# Patient Record
Sex: FEMALE
[De-identification: names, ages, dates, MRNs, and addresses within clinical notes are randomized per-mention and may not be internally consistent; named-entity substitution may affect disease eponyms.]

---

## 2018-10-30 ENCOUNTER — HOSPITAL ENCOUNTER (INPATIENT)
Dept: HOSPITAL 63 - ER | Age: 63
LOS: 6 days | Discharge: NURSING, REHAB OR LONG-TERM CARE | DRG: 885 | End: 2018-11-05
Attending: PSYCHIATRY & NEUROLOGY | Admitting: PSYCHIATRY & NEUROLOGY
Payer: COMMERCIAL

## 2018-10-30 VITALS — DIASTOLIC BLOOD PRESSURE: 79 MMHG | SYSTOLIC BLOOD PRESSURE: 147 MMHG

## 2018-10-30 VITALS — DIASTOLIC BLOOD PRESSURE: 71 MMHG | SYSTOLIC BLOOD PRESSURE: 125 MMHG

## 2018-10-30 VITALS — HEIGHT: 64 IN | WEIGHT: 159.5 LBS | BODY MASS INDEX: 27.23 KG/M2

## 2018-10-30 DIAGNOSIS — N39.0: ICD-10-CM

## 2018-10-30 DIAGNOSIS — E11.42: ICD-10-CM

## 2018-10-30 DIAGNOSIS — Z88.8: ICD-10-CM

## 2018-10-30 DIAGNOSIS — M15.9: ICD-10-CM

## 2018-10-30 DIAGNOSIS — H40.9: ICD-10-CM

## 2018-10-30 DIAGNOSIS — F31.60: Primary | ICD-10-CM

## 2018-10-30 DIAGNOSIS — E11.51: ICD-10-CM

## 2018-10-30 DIAGNOSIS — F41.9: ICD-10-CM

## 2018-10-30 DIAGNOSIS — Z91.14: ICD-10-CM

## 2018-10-30 DIAGNOSIS — M54.9: ICD-10-CM

## 2018-10-30 DIAGNOSIS — I10: ICD-10-CM

## 2018-10-30 DIAGNOSIS — F63.9: ICD-10-CM

## 2018-10-30 DIAGNOSIS — Z79.84: ICD-10-CM

## 2018-10-30 DIAGNOSIS — G89.29: ICD-10-CM

## 2018-10-30 LAB
ALBUMIN SERPL-MCNC: 3.9 G/DL (ref 3.4–5)
ALBUMIN/GLOB SERPL: 1 {RATIO} (ref 1–1.7)
ALP SERPL-CCNC: 66 U/L (ref 46–116)
ALT SERPL-CCNC: 23 U/L (ref 14–59)
ANION GAP SERPL CALC-SCNC: 8 MMOL/L (ref 6–14)
APTT PPP: (no result) S
AST SERPL-CCNC: 10 U/L (ref 15–37)
BACTERIA #/AREA URNS HPF: (no result) /HPF
BASOPHILS # BLD AUTO: 0 X10^3/UL (ref 0–0.2)
BASOPHILS NFR BLD: 0 % (ref 0–3)
BILIRUB SERPL-MCNC: 0.3 MG/DL (ref 0.2–1)
BILIRUB UR QL STRIP: (no result)
BUN/CREAT SERPL: 16 (ref 6–20)
CA-I SERPL ISE-MCNC: 11 MG/DL (ref 7–20)
CALCIUM SERPL-MCNC: 9.5 MG/DL (ref 8.5–10.1)
CHLORIDE SERPL-SCNC: 99 MMOL/L (ref 98–107)
CO2 SERPL-SCNC: 29 MMOL/L (ref 21–32)
CREAT SERPL-MCNC: 0.7 MG/DL (ref 0.6–1)
EOSINOPHIL NFR BLD: 0.1 X10^3/UL (ref 0–0.7)
EOSINOPHIL NFR BLD: 1 % (ref 0–3)
ERYTHROCYTE [DISTWIDTH] IN BLOOD BY AUTOMATED COUNT: 13.8 % (ref 11.5–14.5)
FIBRINOGEN PPP-MCNC: (no result) MG/DL
GFR SERPLBLD BASED ON 1.73 SQ M-ARVRAT: 84.5 ML/MIN
GLOBULIN SER-MCNC: 3.9 G/DL (ref 2.2–3.8)
GLUCOSE SERPL-MCNC: 159 MG/DL (ref 70–99)
GLUCOSE UR STRIP-MCNC: (no result) MG/DL
HCT VFR BLD CALC: 39.5 % (ref 36–47)
HGB BLD-MCNC: 13.6 G/DL (ref 12–15.5)
LYMPHOCYTES # BLD: 1.2 X10^3/UL (ref 1–4.8)
LYMPHOCYTES NFR BLD AUTO: 22 % (ref 24–48)
MAGNESIUM SERPL-MCNC: 1.9 MG/DL (ref 1.8–2.4)
MCH RBC QN AUTO: 30 PG (ref 25–35)
MCHC RBC AUTO-ENTMCNC: 35 G/DL (ref 31–37)
MCV RBC AUTO: 85 FL (ref 79–100)
MONO #: 0.4 X10^3/UL (ref 0–1.1)
MONOCYTES NFR BLD: 7 % (ref 0–9)
NEUT #: 3.6 X10^3UL (ref 1.8–7.7)
NEUTROPHILS NFR BLD AUTO: 69 % (ref 31–73)
NITRITE UR QL STRIP: (no result)
PLATELET # BLD AUTO: 317 X10^3/UL (ref 140–400)
POTASSIUM SERPL-SCNC: 4.5 MMOL/L (ref 3.5–5.1)
PROT SERPL-MCNC: 7.8 G/DL (ref 6.4–8.2)
RBC # BLD AUTO: 4.63 X10^6/UL (ref 3.5–5.4)
RBC #/AREA URNS HPF: (no result) /HPF (ref 0–2)
SODIUM SERPL-SCNC: 136 MMOL/L (ref 136–145)
SP GR UR STRIP: 1.02
SQUAMOUS #/AREA URNS LPF: (no result) /LPF
UROBILINOGEN UR-MCNC: 0.2 MG/DL
WBC # BLD AUTO: 5.3 X10^3/UL (ref 4–11)
WBC #/AREA URNS HPF: >40 /HPF (ref 0–4)

## 2018-10-30 PROCEDURE — 82607 VITAMIN B-12: CPT

## 2018-10-30 PROCEDURE — 83735 ASSAY OF MAGNESIUM: CPT

## 2018-10-30 PROCEDURE — 80061 LIPID PANEL: CPT

## 2018-10-30 PROCEDURE — 82947 ASSAY GLUCOSE BLOOD QUANT: CPT

## 2018-10-30 PROCEDURE — 83540 ASSAY OF IRON: CPT

## 2018-10-30 PROCEDURE — 81001 URINALYSIS AUTO W/SCOPE: CPT

## 2018-10-30 PROCEDURE — 84436 ASSAY OF TOTAL THYROXINE: CPT

## 2018-10-30 PROCEDURE — 86592 SYPHILIS TEST NON-TREP QUAL: CPT

## 2018-10-30 PROCEDURE — 93005 ELECTROCARDIOGRAM TRACING: CPT

## 2018-10-30 PROCEDURE — 84480 ASSAY TRIIODOTHYRONINE (T3): CPT

## 2018-10-30 PROCEDURE — 84443 ASSAY THYROID STIM HORMONE: CPT

## 2018-10-30 PROCEDURE — 82306 VITAMIN D 25 HYDROXY: CPT

## 2018-10-30 PROCEDURE — 85025 COMPLETE CBC W/AUTO DIFF WBC: CPT

## 2018-10-30 PROCEDURE — 80053 COMPREHEN METABOLIC PANEL: CPT

## 2018-10-30 PROCEDURE — 36415 COLL VENOUS BLD VENIPUNCTURE: CPT

## 2018-10-30 PROCEDURE — 87086 URINE CULTURE/COLONY COUNT: CPT

## 2018-10-30 PROCEDURE — 83550 IRON BINDING TEST: CPT

## 2018-10-30 PROCEDURE — 83036 HEMOGLOBIN GLYCOSYLATED A1C: CPT

## 2018-10-30 RX ADMIN — ZOLPIDEM TARTRATE SCH MG: 5 TABLET ORAL at 19:58

## 2018-10-30 RX ADMIN — LURASIDONE HYDROCHLORIDE SCH MG: 40 TABLET, FILM COATED ORAL at 17:01

## 2018-10-30 RX ADMIN — LATANOPROST SCH DROP: 50 SOLUTION OPHTHALMIC at 19:58

## 2018-10-30 NOTE — PDOC
Exam


Note:


Talib Note:


Please also refer to the separate dictated note~for this date of service 

dictated separately.~Patient seen individually. Discussed the patient with 

Nursing staff reviewed the chart.~Reviewed interim history and current 

functioning. Reviewed vital signs,~Labs/ Radiology~and current medications 

noted below. Continue current treatment with the changes noted in the dictated 

addendum note





Assessment:


Vital Signs:





 Vital Signs








  Date Time  Temp Pulse Resp B/P (MAP) Pulse Ox O2 Delivery O2 Flow Rate FiO2


 


10/30/18 16:01 96.9 71 20 125/71 (89) 95 Room Air  








Labs:





 Laboratory Tests








Test


 10/30/18


09:03 10/30/18


09:04


 


White Blood Count


 5.3 x10^3/uL


(4.0-11.0) 





 


Red Blood Count


 4.63 x10^6/uL


(3.50-5.40) 





 


Hemoglobin


 13.6 g/dL


(12.0-15.5) 





 


Hematocrit


 39.5 %


(36.0-47.0) 





 


Mean Corpuscular Volume


 85 fL ()


 





 


Mean Corpuscular Hemoglobin 30 pg (25-35)   


 


Mean Corpuscular Hemoglobin


Concent 35 g/dL


(31-37) 





 


Red Cell Distribution Width


 13.8 %


(11.5-14.5) 





 


Platelet Count


 317 x10^3/uL


(140-400) 





 


Neutrophils (%) (Auto) 69 % (31-73)   


 


Lymphocytes (%) (Auto) 22 % (24-48)  L 


 


Monocytes (%) (Auto) 7 % (0-9)   


 


Eosinophils (%) (Auto) 1 % (0-3)   


 


Basophils (%) (Auto) 0 % (0-3)   


 


Neutrophils # (Auto)


 3.6 x10^3uL


(1.8-7.7) 





 


Lymphocytes # (Auto)


 1.2 x10^3/uL


(1.0-4.8) 





 


Monocytes # (Auto)


 0.4 x10^3/uL


(0.0-1.1) 





 


Eosinophils # (Auto)


 0.1 x10^3/uL


(0.0-0.7) 





 


Basophils # (Auto)


 0.0 x10^3/uL


(0.0-0.2) 





 


Sodium Level


 136 mmol/L


(136-145) 





 


Potassium Level


 4.5 mmol/L


(3.5-5.1) 





 


Chloride Level


 99 mmol/L


() 





 


Carbon Dioxide Level


 29 mmol/L


(21-32) 





 


Anion Gap 8 (6-14)   


 


Blood Urea Nitrogen


 11 mg/dL


(7-20) 





 


Creatinine


 0.7 mg/dL


(0.6-1.0) 





 


Estimated GFR


(Cockcroft-Gault) 84.5  


 





 


BUN/Creatinine Ratio 16 (6-20)   


 


Glucose Level


 159 mg/dL


(70-99)  H 





 


Calcium Level


 9.5 mg/dL


(8.5-10.1) 





 


Magnesium Level


 1.9 mg/dL


(1.8-2.4) 





 


Total Bilirubin


 0.3 mg/dL


(0.2-1.0) 





 


Aspartate Amino Transferase


(AST) 10 U/L (15-37)


L 





 


Alanine Aminotransferase (ALT)


 23 U/L (14-59)


 





 


Alkaline Phosphatase


 66 U/L


() 





 


Total Protein


 7.8 g/dL


(6.4-8.2) 





 


Albumin


 3.9 g/dL


(3.4-5.0) 





 


Albumin/Globulin Ratio 1.0 (1.0-1.7)   


 


Urine Collection Type  Unknown  


 


Urine Color  Kathrine  


 


Urine Clarity  Cloudy  


 


Urine pH  6.5  


 


Urine Specific Gravity  1.020  


 


Urine Protein


 


 30 mg/dl


(NEG-TRACE)


 


Urine Glucose (UA)


 


 Neg mg/dL


(NEG)


 


Urine Ketones (Stick)


 


 Trace mg/dL


(NEG)


 


Urine Blood  Trace (NEG)  


 


Urine Nitrite  Neg (NEG)  


 


Urine Bilirubin  Neg (NEG)  


 


Urine Urobilinogen Dipstick


 


 0.2 mg/dL (0.2


mg/dL)


 


Urine Leukocyte Esterase  Mod (NEG)  


 


Urine RBC


 


 1-2 /HPF (0-2)





 


Urine WBC


 


 >40 /HPF (0-4)





 


Urine Squamous Epithelial


Cells 


 Many /LPF  





 


Urine Transitional Epithelial


Cells 


 Few /LPF  





 


Urine Bacteria


 


 Mod /HPF


(0-FEW)


 


Urine Mucus  Marked /LPF  











Current Medications:


Meds:





Current Medications


Trimethoprim/ Sulfamethoxazole (Bactrim Ds) 1 tab 1X  ONCE PO  Last 

administered on 10/30/18at 09:56;  Start 10/30/18 at 10:10;  Stop 10/30/18 at 10

:11;  Status DC


Acetaminophen (Tylenol) 650 mg PRN Q6HRS  PRN PO PAIN / TEMP;  Start 10/30/18 

at 12:00


Multi-Ingredient Ointment (Analgesic Balm) 1 charis PRN QID  PRN TP MUSCLE PAIN;  

Start 10/30/18 at 12:00


Al Hydroxide/Mg Hydroxide (Mylanta Plus Xs) 15 ml PRN AFTMEALHC  PRN PO 

DYSPEPSIA;  Start 10/30/18 at 12:00


Magnesium Hydroxide (Milk Of Magnesia) 2,400 mg PRN QHS  PRN PO CONSTIPATION;  

Start 10/30/18 at 12:00


Diclofenac Sodium (Voltaren) 1 charis PRN QID  PRN TP PAIN;  Start 10/30/18 at 12:

30;  Stop 10/30/18 at 13:22;  Status DC


Ibuprofen (Motrin) 600 mg PRN Q6HRS  PRN PO INFLAMMATION;  Start 10/30/18 at 12:

45


Ascorbic Acid (Vitamin C) 500 mg DAILY PO ;  Start 10/31/18 at 09:00


Insulin Glargine (Lantus) 20 units QHS SQ ;  Start 10/30/18 at 21:00;  Stop 10/

30/18 at 21:00;  Status DC


Metformin HCl (Glucophage) 1,000 mg BIDWMEALS PO ;  Start 10/30/18 at 17:00;  

Stop 10/30/18 at 17:00;  Status DC


Metformin HCl (Glucophage) 1,000 mg DAILYWBKFT PO ;  Start 10/31/18 at 08:00


Zolpidem Tartrate (Ambien) 5 mg PRN QHS  PRN PO INSOMNIA;  Start 10/30/18 at 13:

45;  Stop 10/30/18 at 13:46;  Status DC


Benzocaine (Ora-Jel Maximum) 1 charis PRN Q6HRS  PRN TP ORAL PAIN;  Start 10/30/18 

at 13:45


Glucose (Insta-Glucose) 15 gm PRN Q15MIN  PRN PO LOW BLOOD SUGAR;  Start 10/30/

18 at 13:45


Latanoprost (Xalatan) 1 drop QHS OU  Last administered on 10/30/18at 19:58;  

Start 10/30/18 at 21:00


Lurasidone HCl (Latuda) 80 mg DAILYWSUP PO  Last administered on 10/30/18at 17:

01;  Start 10/30/18 at 17:00


Insulin Glargine (Lantus) 20 units DAILY SQ ;  Start 10/31/18 at 09:00


Zolpidem Tartrate (Ambien) 5 mg QHS PO  Last administered on 10/30/18at 19:58;  

Start 10/30/18 at 21:00





Active Scripts


Active


Reported


Glucose (Dextrose) 15 Gm/59 Ml Liquid 15 Gm PO PRN QID


Anbesol (Benzocaine) 9 Gm Gel..gram. 9 Gm MM PRN Q6HRS PRN


Latanoprost 2.5 Ml Drops 1 Drop EACHEYE QHS


Ibuprofen 400 Mg Tablet 600 Mg PO PRN Q6HRS PRN


Metformin Hcl 1,000 Mg Tablet 1,000 Mg PO DAILY


Vitamin C (Ascorbic Acid) 500 Mg Capsule 500 Mg PO DAILY


Levemir (Insulin Detemir) 100 Unit/1 Ml Vial 20 Unit SQ HS


Latuda (Lurasidone Hcl) 80 Mg Tablet 80 Mg PO DAILYBFRSUP


Ambien (Zolpidem Tartrate) 5 Mg Tablet 5 Mg PO PRN QHS PRN


I have reviewed the current psychotropics carefully including drug 

interactions.  Risk benefit ratio favors no change other than as noted in my 

dictated progress note.





Diagnosis:


Problems:  


(1) Urinary tract infection


(2) Diabetes mellitus


(3) Behavioral problem


(4) Medical clearance for psychiatric admission


(5) Anxiety disorder


(6) Bipolar affective, mixed, sev w/ psych


(7) Impulse control disorder











WESLY RAZA MD Oct 30, 2018 23:05

## 2018-10-30 NOTE — EKG
[facility] [address], [place] [address]

Test Date:    2018-10-30               Test Time:    09:12:29

Pat Name:     MAREK CROWDER      Department:   

Patient ID:   [id number]           Room:          

Gender:       F                        Technician:   

:          [id number]               Requested By: FISH LYLE

Order Number: [id number]            Reading MD:   Murray Gutierrez

                                 Measurements

Intervals                              Axis          

Rate:         50                       P:            69

NH:           152                      QRS:          1

QRSD:         80                       T:            49

QT:           424                                    

QTc:          389                                    

                           Interpretive Statements

SINUS RHYTHM

INCOMPLETE RIGHT BUNDLE BRANCH BLOCK



Electronically Signed On 2018 10:19:23 CDT by Murray Gutierrez

## 2018-10-30 NOTE — PHYS DOC
Past History


Past Medical History:  Alcoholism, Arthritis, Diabetes, Hypertension, Other


Past Surgical History:  Other


Alcohol Use:  Occasionally


Drug Use:  None





Adult General


Chief Complaint


Chief Complaint:  PSYCH EVALUATION





[facility]





Patient is a 63 year old female who brought in by nursing home staff for 

medical clearance for psych admission. Nursing home reported that patient had 

hallucination and talking to third person and had aggressive behavior. Patient 

is alert and oriented x3 and denies any problem and states she doesn't know why 

she is here. Patient denies suicidal and homicidal ideation and hallucination.





Review of Systems


Review of Systems





Constitutional: Denies fever or chills []


Eyes: Denies change in visual acuity, redness, or eye pain []


HENT: Denies nasal congestion or sore throat []


Respiratory: Denies cough or shortness of breath []


Cardiovascular: No additional information not addressed in HPI []


GI: Denies abdominal pain, nausea, vomiting, bloody stools or diarrhea []


: Denies dysuria or hematuria []


Musculoskeletal: Denies back pain or joint pain []


Integument: Denies rash or skin lesions []


Neurologic: Denies headache, focal weakness or sensory changes []


Endocrine: Denies polyuria or polydipsia []





All other systems were reviewed and found to be within normal limits, except as 

documented in this note.





Allergies


Allergies





Allergies








Coded Allergies Type Severity Reaction Last Updated Verified


 


  bimatoprost Allergy Intermediate  10/30/18 Yes


 


  clotrimazole Allergy Intermediate  10/30/18 Yes


 


  pioglitazone Allergy Intermediate  10/30/18 Yes


 


  glyburide Adverse Reaction Intermediate  10/30/18 Yes











Physical Exam


Physical Exam





Constitutional: Well developed, well nourished, no acute distress, non-toxic 

appearance. []


HENT: Normocephalic, atraumatic


Eyes: PERRLA, EOMI, conjunctiva normal, no discharge. [] 


Neck: Normal range of motion, no tenderness, supple, no stridor. [] 


Cardiovascular:Heart rate regular rhythm, no murmur []


Lungs & Thorax:  Bilateral breath sounds clear to auscultation []


Abdomen: Bowel sounds normal, soft, no tenderness, no masses, no pulsatile 

masses. [] 


Skin: Warm, dry, no erythema, no rash. [] 


Back: No tenderness, no CVA tenderness. [] 


Extremities: No tenderness, no cyanosis, no clubbing, ROM intact, no edema. [] 


Neurologic: Alert and oriented X 3, normal motor function, normal sensory 

function, no focal deficits noted. []


Psychologic: Affect normal, judgement normal, mood normal. []





Current Patient Data


Vital Signs





 Vital Signs








  Date Time  Temp Pulse Resp B/P (MAP) Pulse Ox O2 Delivery O2 Flow Rate FiO2


 


10/30/18 09:11 98.3 59 18  96 Room Air  








Lab Results





 Laboratory Tests








Test


 10/30/18


09:03 10/30/18


09:04


 


White Blood Count


 5.3 x10^3/uL


(4.0-11.0) 





 


Red Blood Count


 4.63 x10^6/uL


(3.50-5.40) 





 


Hemoglobin


 13.6 g/dL


(12.0-15.5) 





 


Hematocrit


 39.5 %


(36.0-47.0) 





 


Mean Corpuscular Volume


 85 fL ()


 





 


Mean Corpuscular Hemoglobin 30 pg (25-35)   


 


Mean Corpuscular Hemoglobin


Concent 35 g/dL


(31-37) 





 


Red Cell Distribution Width


 13.8 %


(11.5-14.5) 





 


Platelet Count


 317 x10^3/uL


(140-400) 





 


Neutrophils (%) (Auto) 69 % (31-73)   


 


Lymphocytes (%) (Auto) 22 % (24-48)  L 


 


Monocytes (%) (Auto) 7 % (0-9)   


 


Eosinophils (%) (Auto) 1 % (0-3)   


 


Basophils (%) (Auto) 0 % (0-3)   


 


Neutrophils # (Auto)


 3.6 x10^3uL


(1.8-7.7) 





 


Lymphocytes # (Auto)


 1.2 x10^3/uL


(1.0-4.8) 





 


Monocytes # (Auto)


 0.4 x10^3/uL


(0.0-1.1) 





 


Eosinophils # (Auto)


 0.1 x10^3/uL


(0.0-0.7) 





 


Basophils # (Auto)


 0.0 x10^3/uL


(0.0-0.2) 





 


Sodium Level


 136 mmol/L


(136-145) 





 


Potassium Level


 4.5 mmol/L


(3.5-5.1) 





 


Chloride Level


 99 mmol/L


() 





 


Carbon Dioxide Level


 29 mmol/L


(21-32) 





 


Anion Gap 8 (6-14)   


 


Blood Urea Nitrogen


 11 mg/dL


(7-20) 





 


Creatinine


 0.7 mg/dL


(0.6-1.0) 





 


Estimated GFR


(Cockcroft-Gault) 84.5  


 





 


BUN/Creatinine Ratio 16 (6-20)   


 


Glucose Level


 159 mg/dL


(70-99)  H 





 


Calcium Level


 9.5 mg/dL


(8.5-10.1) 





 


Magnesium Level


 1.9 mg/dL


(1.8-2.4) 





 


Total Bilirubin


 0.3 mg/dL


(0.2-1.0) 





 


Aspartate Amino Transferase


(AST) 10 U/L (15-37)


L 





 


Alanine Aminotransferase (ALT)


 23 U/L (14-59)


 





 


Alkaline Phosphatase


 66 U/L


() 





 


Total Protein


 7.8 g/dL


(6.4-8.2) 





 


Albumin


 3.9 g/dL


(3.4-5.0) 





 


Albumin/Globulin Ratio 1.0 (1.0-1.7)   


 


Urine Collection Type  Unknown  


 


Urine Color  Kathrine  


 


Urine Clarity  Cloudy  


 


Urine pH  6.5  


 


Urine Specific Gravity  1.020  


 


Urine Protein


 


 30 mg/dl


(NEG-TRACE)


 


Urine Glucose (UA)


 


 Neg mg/dL


(NEG)


 


Urine Ketones (Stick)


 


 Trace mg/dL


(NEG)


 


Urine Blood  Trace (NEG)  


 


Urine Nitrite  Neg (NEG)  


 


Urine Bilirubin  Neg (NEG)  


 


Urine Urobilinogen Dipstick


 


 0.2 mg/dL (0.2


mg/dL)


 


Urine Leukocyte Esterase  Mod (NEG)  


 


Urine RBC


 


 1-2 /HPF (0-2)





 


Urine WBC


 


 >40 /HPF (0-4)





 


Urine Squamous Epithelial


Cells 


 Many /LPF  





 


Urine Transitional Epithelial


Cells 


 Few /LPF  





 


Urine Bacteria


 


 Mod /HPF


(0-FEW)


 


Urine Mucus  Marked /LPF  











EKG


EKG


EKG interpreted by me. EKG at 0 912 showed sinus bradycardia at rate of 50, 

incomplete right bundle-branch block, no acute distress and T-wave abnormalities





Radiology/Procedures


Radiology/Procedures


[]





Course & Med Decision Making


Course & Med Decision Making


Pertinent Labs  reviewed. (See chart for details)





Evaluation of patient in ER showed 63-year-old female patient brought in for 

medical clearance for psych admission. Patient was alert and oriented and 

cooperative. Labs showed UTI and patient treated with Bactrim in ER. Patient 

was medically cleared for psych admission.





Dragon Disclaimer


Dragon Disclaimer


This electronic medical record was generated, in whole or in part, using a 

voice recognition dictation system.





Departure


Departure:


Impression:  


 Primary Impression:  


 Medical clearance for psychiatric admission


 Additional Impressions:  


 Urinary tract infection


 Diabetes mellitus


 Behavioral problem


Disposition:  09 ADMITTED AS INPATIENT (to senior behavioral unit at 0945)


Condition:  STABLE


Referrals:  


JOSE MTZ MD (PCP)





Problem Qualifiers











FISH LYEL MD Oct 30, 2018 09:48

## 2018-10-30 NOTE — HP
ADMIT DATE:  10/30/2018



PSYCHIATRIC ADMISSION HISTORY/EVALUATION



This note covers elements not covered in my initial note 10/30/2018.



IDENTIFYING DATA:  The patient is a 63-year-old  female who is referred

to us from [facility] in [place], [place] by Dr. Pulido,

her primary care physician on account of having auditory and visual

hallucinations.  Reportedly, she has been aggressive, talks in the third person.

 She is noncompliant with medications and cares.  She has been making Hinduism

statements and grandiose statements that she has been "healed."  She has been

exit seeking, has failed outpatient psychiatric interventions and inpatient

psychiatric hospitalization at [facility] from 10/21/2018 to

10/26/2018.  The patient referred for inpatient psychiatric stabilization.



CHIEF COMPLAINT:  "No.  I do not see and hear things.  They just say I do.  No,

I never used to drink."



The patient minimizes most of her problems including her past alcohol abuse, but

in fact there is documentation about her significant past alcohol abuse.



HISTORY OF PRESENT ILLNESS:  The patient has a history of mood swings,

depression, psychotic symptoms and she minimizes most of this.  Cognitively, she

has been reasonably intact.  She has had some sleep disturbance, marked mood

lability, and agitation is noted.  No active suicidal or homicidal ideation.



PAST PSYCHIATRIC HISTORY:  As above.



PAST MEDICAL HISTORY:  The patient does have a UTI, received a dose of Bactrim

in the ED.  The patient does have a history of diabetes mellitus, glaucoma,

hypertension, polyneuropathy, peripheral vascular disease, osteoarthritis.



ACCU-CHEKS:  A.c. and at bedtime.



DIET:  Regular.  Takes medications whole.  Ambulates with walker.



CODE STATUS:  FULL CODE.



ALLERGIES:  GLYBURIDE, ACTOS, LOTRIMIN, LUMIGAN.



CURRENT PSYCHOTROPICS:  Latuda 80 mg at bedtime, Ambien 5 mg at bedtime.



FAMILY HISTORY:  Noncontributory.



SOCIAL HISTORY:  Alcohol abuse history as noted above.  No physical, sexual or

elder abuse history is noted.  She is not known to be a perpetrator.  She stays

still about age 10.  [place] [employer] [place].  She states she has 1 son who lives and

works in [place], [place].  [place].



MENTAL STATUS EXAM:  The patient was seen individually in her room, evening of

10/30/2018.  She was aware of the date, but felt it was 10/25/2018.  She is

aware of who the President was, able to do one step on serial sevens, remembered

2/3 objects at 3 minutes.  Speech is coherent, abstraction fair, computation

impaired, language function intact.  Mood and affect remain somewhat labile.  No

active suicidal or homicidal ideation.



LABORATORY DATA:  Reviewed.



IMPRESSION:  History of major depressive disorder with psychotic features;  past

history of alcohol abuse or dependence, possible bipolar 1 disorder, mixed with

psychotic features; anxiety disorder, unspecified; impulse control disorder,

unspecified; urinary tract infection.  Rest as above.



PLAN:  Admit to Geropsychiatry Unit at [facility].  I see the patient

daily individually from a psychiatric standpoint, medical followup with Dr. Pulido/Dr. Palmer.  Continue the patient on her current psychotropics, observe

baseline, then adjust as clinically indicated.  Treat the UTI.



ESTIMATED LENGTH OF STAY:  7-10 days.



DISPOSITION:  Plans back to [facility].





______________________________

MAN ALEXANDRIA RAZA MD



DR:  AMELIA/venancio  JOB#:  [id number] / [id number]

DD:  10/30/2018 18:42  DT:  10/30/2018 19:50

## 2018-10-31 VITALS — SYSTOLIC BLOOD PRESSURE: 126 MMHG | DIASTOLIC BLOOD PRESSURE: 76 MMHG

## 2018-10-31 VITALS — SYSTOLIC BLOOD PRESSURE: 136 MMHG | DIASTOLIC BLOOD PRESSURE: 76 MMHG

## 2018-10-31 LAB
CHOLEST/HDLC SERPL: 4 {RATIO}
HBA1C MFR BLD: 7 % (ref 4.8–5.6)
HDLC SERPL-MCNC: 58 MG/DL (ref 40–60)
LDLC: 171 MG/DL (ref 0–100)
T3 SERPL-MCNC: 93 NG/DL (ref 71–180)
T4 SERPL-MCNC: 8.9 UG/DL (ref 4.5–12)
THYROID STIM HORMONE (TSH): 1.83 UIU/ML (ref 0.36–3.74)
TRIGL SERPL-MCNC: 134 MG/DL (ref 0–150)
VLDLC: 26 MG/DL (ref 0–40)

## 2018-10-31 RX ADMIN — LURASIDONE HYDROCHLORIDE SCH MG: 40 TABLET, FILM COATED ORAL at 18:05

## 2018-10-31 RX ADMIN — SULFAMETHOXAZOLE AND TRIMETHOPRIM SCH TAB: 800; 160 TABLET ORAL at 19:50

## 2018-10-31 RX ADMIN — ZOLPIDEM TARTRATE SCH MG: 5 TABLET ORAL at 19:51

## 2018-10-31 RX ADMIN — OXYCODONE HYDROCHLORIDE AND ACETAMINOPHEN SCH MG: 500 TABLET ORAL at 08:55

## 2018-10-31 RX ADMIN — Medication SCH CAP: at 19:50

## 2018-10-31 RX ADMIN — INSULIN GLARGINE SCH UNITS: 100 INJECTION, SOLUTION SUBCUTANEOUS at 19:04

## 2018-10-31 RX ADMIN — LATANOPROST SCH DROP: 50 SOLUTION OPHTHALMIC at 19:51

## 2018-10-31 NOTE — PDOC
Exam


Note:


Talib Note:


Please also refer to the separate dictated note~for this date of service 

dictated separately.~Patient seen individually. Discussed the patient with 

Nursing staff reviewed the chart.~Reviewed interim history and current 

functioning. Reviewed vital signs,~Labs/ Radiology~and current medications 

noted below. Continue current treatment with the changes noted in the dictated 

addendum note





Assessment:


Vital Signs:





 Vital Signs








  Date Time  Temp Pulse Resp B/P (MAP) Pulse Ox O2 Delivery O2 Flow Rate FiO2


 


10/31/18 16:32 97.2 68 16 136/76 (96) 97   


 


10/30/18 16:01      Room Air  








I&O











Intake and Output 


 


 10/31/18





 07:00


 


Intake Total 580 ml


 


Balance 580 ml


 


 


 


Intake Oral 580 ml








Labs:





 Laboratory Tests








Test


 10/31/18


07:26


 


Glucose (Fingerstick)


 253 mg/dL


(70-99)  H











Current Medications:


Meds:





Current Medications


Trimethoprim/ Sulfamethoxazole (Bactrim Ds) 1 tab 1X  ONCE PO  Last 

administered on 10/30/18at 09:56;  Start 10/30/18 at 10:10;  Stop 10/30/18 at 10

:11;  Status DC


Acetaminophen (Tylenol) 650 mg PRN Q6HRS  PRN PO PAIN / TEMP;  Start 10/30/18 

at 12:00


Multi-Ingredient Ointment (Analgesic Balm) 1 charis PRN QID  PRN TP MUSCLE PAIN;  

Start 10/30/18 at 12:00


Al Hydroxide/Mg Hydroxide (Mylanta Plus Xs) 15 ml PRN AFTMEALHC  PRN PO 

DYSPEPSIA;  Start 10/30/18 at 12:00


Magnesium Hydroxide (Milk Of Magnesia) 2,400 mg PRN QHS  PRN PO CONSTIPATION;  

Start 10/30/18 at 12:00


Diclofenac Sodium (Voltaren) 1 charis PRN QID  PRN TP PAIN;  Start 10/30/18 at 12:

30;  Stop 10/30/18 at 13:22;  Status DC


Ibuprofen (Motrin) 600 mg PRN Q6HRS  PRN PO INFLAMMATION;  Start 10/30/18 at 12:

45


Ascorbic Acid (Vitamin C) 500 mg DAILY PO  Last administered on 10/31/18at 08:55

;  Start 10/31/18 at 09:00


Insulin Glargine (Lantus) 20 units QHS SQ ;  Start 10/30/18 at 21:00;  Stop 10/

30/18 at 21:00;  Status DC


Metformin HCl (Glucophage) 1,000 mg BIDWMEALS PO ;  Start 10/30/18 at 17:00;  

Stop 10/30/18 at 17:00;  Status DC


Metformin HCl (Glucophage) 1,000 mg DAILYWBKFT PO  Last administered on 10/31/

18at 08:55;  Start 10/31/18 at 08:00


Zolpidem Tartrate (Ambien) 5 mg PRN QHS  PRN PO INSOMNIA;  Start 10/30/18 at 13:

45;  Stop 10/30/18 at 13:46;  Status DC


Benzocaine (Ora-Jel Maximum) 1 charis PRN Q6HRS  PRN TP ORAL PAIN;  Start 10/30/18 

at 13:45


Glucose (Insta-Glucose) 15 gm PRN Q15MIN  PRN PO LOW BLOOD SUGAR;  Start 10/30/

18 at 13:45


Latanoprost (Xalatan) 1 drop QHS OU  Last administered on 10/31/18at 19:51;  

Start 10/30/18 at 21:00


Lurasidone HCl (Latuda) 80 mg DAILYWSUP PO  Last administered on 10/31/18at 18:

05;  Start 10/30/18 at 17:00


Insulin Glargine (Lantus) 20 units DAILY SQ  Last administered on 10/31/18at 19:

04;  Start 10/31/18 at 09:00


Zolpidem Tartrate (Ambien) 5 mg QHS PO  Last administered on 10/31/18at 19:51;  

Start 10/30/18 at 21:00


Trimethoprim/ Sulfamethoxazole (Bactrim Ds) 1 tab BID PO  Last administered on 

10/31/18at 19:50;  Start 10/31/18 at 21:00;  Stop 11/7/18 at 20:59


Lactobacillus Rhamnosus (Culturelle) 1 cap BID PO  Last administered on 10/31/

18at 19:50;  Start 10/31/18 at 21:00


Sertraline HCl (Zoloft) 25 mg DAILY PO ;  Start 11/1/18 at 09:00





Active Scripts


Active


Reported


Glucose (Dextrose) 15 Gm/59 Ml Liquid 15 Gm PO PRN QID


Anbesol (Benzocaine) 9 Gm Gel..gram. 9 Gm MM PRN Q6HRS PRN


Latanoprost 2.5 Ml Drops 1 Drop EACHEYE QHS


Ibuprofen 400 Mg Tablet 600 Mg PO PRN Q6HRS PRN


Metformin Hcl 1,000 Mg Tablet 1,000 Mg PO DAILY


Vitamin C (Ascorbic Acid) 500 Mg Capsule 500 Mg PO DAILY


Levemir (Insulin Detemir) 100 Unit/1 Ml Vial 20 Unit SQ HS


Latuda (Lurasidone Hcl) 80 Mg Tablet 80 Mg PO DAILYBFRSUP


Ambien (Zolpidem Tartrate) 5 Mg Tablet 5 Mg PO PRN QHS PRN


I have reviewed the current psychotropics carefully including drug 

interactions.  Risk benefit ratio favors no change other than as noted in my 

dictated progress note.





Diagnosis:


Problems:  


(1) Urinary tract infection


(2) Diabetes mellitus


(3) Behavioral problem


(4) Medical clearance for psychiatric admission


(5) Anxiety disorder


(6) Bipolar affective, mixed, sev w/ psych


(7) Impulse control disorder











WESLY RAZA MD Oct 31, 2018 23:15

## 2018-11-01 VITALS — SYSTOLIC BLOOD PRESSURE: 133 MMHG | DIASTOLIC BLOOD PRESSURE: 71 MMHG

## 2018-11-01 VITALS — SYSTOLIC BLOOD PRESSURE: 112 MMHG | DIASTOLIC BLOOD PRESSURE: 56 MMHG

## 2018-11-01 RX ADMIN — INSULIN GLARGINE SCH UNITS: 100 INJECTION, SOLUTION SUBCUTANEOUS at 07:37

## 2018-11-01 RX ADMIN — LATANOPROST SCH DROP: 50 SOLUTION OPHTHALMIC at 20:28

## 2018-11-01 RX ADMIN — LURASIDONE HYDROCHLORIDE SCH MG: 40 TABLET, FILM COATED ORAL at 17:17

## 2018-11-01 RX ADMIN — SULFAMETHOXAZOLE AND TRIMETHOPRIM SCH TAB: 800; 160 TABLET ORAL at 20:26

## 2018-11-01 RX ADMIN — ZOLPIDEM TARTRATE SCH MG: 5 TABLET ORAL at 20:26

## 2018-11-01 RX ADMIN — SULFAMETHOXAZOLE AND TRIMETHOPRIM SCH TAB: 800; 160 TABLET ORAL at 07:34

## 2018-11-01 RX ADMIN — ACETAMINOPHEN PRN MG: 325 TABLET, FILM COATED ORAL at 05:46

## 2018-11-01 RX ADMIN — Medication SCH CAP: at 20:28

## 2018-11-01 RX ADMIN — ACETAMINOPHEN PRN MG: 325 TABLET, FILM COATED ORAL at 18:27

## 2018-11-01 RX ADMIN — Medication SCH CAP: at 07:34

## 2018-11-01 RX ADMIN — SERTRALINE SCH MG: 25 TABLET, FILM COATED ORAL at 07:36

## 2018-11-01 RX ADMIN — OXYCODONE HYDROCHLORIDE AND ACETAMINOPHEN SCH MG: 500 TABLET ORAL at 07:34

## 2018-11-01 NOTE — PDOC
Exam


Note:


Talib Note:


Please also refer to the separate dictated note~for this date of service 

dictated separately.~Patient seen individually. Discussed the patient with 

Nursing staff reviewed the chart.~Reviewed interim history and current 

functioning. Reviewed vital signs,~Labs/ Radiology~and current medications 

noted below. Continue current treatment with the changes noted in the dictated 

addendum note





Assessment:


Vital Signs:





 Vital Signs








  Date Time  Temp Pulse Resp B/P (MAP) Pulse Ox O2 Delivery O2 Flow Rate FiO2


 


11/1/18 16:06 97.1 62 16 112/56 (74) 97 Room Air  








I&O











Intake and Output 


 


 11/1/18





 07:00


 


Intake Total 1080 ml


 


Balance 1080 ml


 


 


 


Intake Oral 1080 ml








Labs:





 Laboratory Tests








Test


 11/1/18


07:45


 


Glucose (Fingerstick)


 133 mg/dL


(70-99)  H











Current Medications:


Meds:





Current Medications


Trimethoprim/ Sulfamethoxazole (Bactrim Ds) 1 tab 1X  ONCE PO  Last 

administered on 10/30/18at 09:56;  Start 10/30/18 at 10:10;  Stop 10/30/18 at 10

:11;  Status DC


Acetaminophen (Tylenol) 650 mg PRN Q6HRS  PRN PO PAIN / TEMP Last administered 

on 11/1/18at 18:27;  Start 10/30/18 at 12:00


Multi-Ingredient Ointment (Analgesic Balm) 1 charis PRN QID  PRN TP MUSCLE PAIN;  

Start 10/30/18 at 12:00


Al Hydroxide/Mg Hydroxide (Mylanta Plus Xs) 15 ml PRN AFTMEALHC  PRN PO 

DYSPEPSIA;  Start 10/30/18 at 12:00


Magnesium Hydroxide (Milk Of Magnesia) 2,400 mg PRN QHS  PRN PO CONSTIPATION;  

Start 10/30/18 at 12:00


Diclofenac Sodium (Voltaren) 1 charis PRN QID  PRN TP PAIN;  Start 10/30/18 at 12:

30;  Stop 10/30/18 at 13:22;  Status DC


Ibuprofen (Motrin) 600 mg PRN Q6HRS  PRN PO INFLAMMATION;  Start 10/30/18 at 12:

45


Ascorbic Acid (Vitamin C) 500 mg DAILY PO  Last administered on 11/1/18at 07:34

;  Start 10/31/18 at 09:00


Insulin Glargine (Lantus) 20 units QHS SQ ;  Start 10/30/18 at 21:00;  Stop 10/

30/18 at 21:00;  Status DC


Metformin HCl (Glucophage) 1,000 mg BIDWMEALS PO ;  Start 10/30/18 at 17:00;  

Stop 10/30/18 at 17:00;  Status DC


Metformin HCl (Glucophage) 1,000 mg DAILYWBKFT PO  Last administered on 11/1/ 18at 07:34;  Start 10/31/18 at 08:00


Zolpidem Tartrate (Ambien) 5 mg PRN QHS  PRN PO INSOMNIA;  Start 10/30/18 at 13:

45;  Stop 10/30/18 at 13:46;  Status DC


Benzocaine (Ora-Jel Maximum) 1 charis PRN Q6HRS  PRN TP ORAL PAIN;  Start 10/30/18 

at 13:45


Glucose (Insta-Glucose) 15 gm PRN Q15MIN  PRN PO LOW BLOOD SUGAR;  Start 10/30/

18 at 13:45


Latanoprost (Xalatan) 1 drop QHS OU  Last administered on 11/1/18at 20:28;  

Start 10/30/18 at 21:00


Lurasidone HCl (Latuda) 80 mg DAILYWSUP PO  Last administered on 11/1/18at 17:17

;  Start 10/30/18 at 17:00


Insulin Glargine (Lantus) 20 units DAILY SQ  Last administered on 11/1/18at 07:

37;  Start 10/31/18 at 09:00


Zolpidem Tartrate (Ambien) 5 mg QHS PO  Last administered on 11/1/18at 20:26;  

Start 10/30/18 at 21:00


Trimethoprim/ Sulfamethoxazole (Bactrim Ds) 1 tab BID PO  Last administered on 

11/1/18at 20:26;  Start 10/31/18 at 21:00;  Stop 11/7/18 at 20:59


Lactobacillus Rhamnosus (Culturelle) 1 cap BID PO  Last administered on 11/1/ 18at 20:28;  Start 10/31/18 at 21:00


Sertraline HCl (Zoloft) 25 mg DAILY PO  Last administered on 11/1/18at 07:36;  

Start 11/1/18 at 09:00





Active Scripts


Active


Reported


Glucose (Dextrose) 15 Gm/59 Ml Liquid 15 Gm PO PRN QID


Anbesol (Benzocaine) 9 Gm Gel..gram. 9 Gm MM PRN Q6HRS PRN


Latanoprost 2.5 Ml Drops 1 Drop EACHEYE QHS


Ibuprofen 400 Mg Tablet 600 Mg PO PRN Q6HRS PRN


Metformin Hcl 1,000 Mg Tablet 1,000 Mg PO DAILY


Vitamin C (Ascorbic Acid) 500 Mg Capsule 500 Mg PO DAILY


Levemir (Insulin Detemir) 100 Unit/1 Ml Vial 20 Unit SQ HS


Latuda (Lurasidone Hcl) 80 Mg Tablet 80 Mg PO DAILYBFRSUP


Ambien (Zolpidem Tartrate) 5 Mg Tablet 5 Mg PO PRN QHS PRN


I have reviewed the current psychotropics carefully including drug 

interactions.  Risk benefit ratio favors no change other than as noted in my 

dictated progress note.





Diagnosis:


Problems:  


(1) Urinary tract infection


(2) Diabetes mellitus


(3) Behavioral problem


(4) Medical clearance for psychiatric admission


(5) Anxiety disorder


(6) Bipolar affective, mixed, sev w/ psych


(7) Impulse control disorder











WESLY RAZA MD Nov 1, 2018 23:06

## 2018-11-01 NOTE — CONS
DATE OF CONSULTATION:  10/31/2018



REASON FOR CONSULTATION:  Medical management.



HISTORY OF PRESENT ILLNESS:  The patient is a 63-year-old female patient,

resident at [facility] in [place], who was admitted on account of

having auditory and visual hallucination.  Reportedly, she has been aggressive

towards the third person.  She is noncompliant with medication and care.  She

has been making Sikh statement and a grandiose statement stating that she

was healed.  She has been exit seeking, has failed outpatient psychiatric

intervention and inpatient psychiatric hospitalization at [facility] from 10/26/2018-10/31/2018.  She was admitted to this unit for

inpatient psychiatric stabilization.



PAST MEDICAL HISTORY:  Significant for type 2 diabetes, glaucoma, hypertension,

diabetic polyneuropathy, peripheral vascular disease, generalized osteoarthritis

and chronic back pain.  She was diagnosed with urinary tract infection and

received a dose of Bactrim in the Emergency Department.



PAST PSYCHIATRIC HISTORY:  Significant for mood swings and depression, psychotic

symptoms; however, she cognitively seemed to be reasonably intact with no active

suicidal or homicidal ideation.



PAST SURGICAL HISTORY:  Unremarkable.



ALLERGIES:  She is allergic to GLYBURIDE, ACTOS, LOTRIMIN AND LUMIGAN.



FAMILY HISTORY:  Noncontributory.



SOCIAL HISTORY:  She is currently a resident at [facility] in [place].

 She apparently has history of alcohol abuse.  She apparently does not smoke or

use drugs.  [place].  She has 1 son, who lives and works in [place], [place].



MEDICATIONS:  She is currently on the following medications, ibuprofen 600 mg

p.o. every 6 hours as needed, lurasidone for Latuda 80 mg daily, Ambien 5 mg at

bedtime.  She is on benzocaine applied topically every 6 hours, latanoprost 1

drop to both eyes at bedtime, metformin 1000 mg daily.  She is on Levemir

insulin 20 units at bedtime, ascorbic acid 500 mg once a day.



REVIEW OF SYSTEMS:  As per history of present illness.



PHYSICAL EXAMINATION

GENERAL:  When I examined her, she was sitting on the edge of the bed

comfortably, in no apparent respiratory distress, slightly pale, but no

jaundice, cyanosis, or thyromegaly.  No jugular venous distension.  No lower

limb edema.

VITAL SIGNS:  Her heart rate was 55, blood pressure 126/76, temperature was

97.3, respiratory rate was 18 and oxygen saturation was 95%.

HEAD, EYES, EARS, NOSE AND THROAT:  Showed normocephalic, atraumatic.

NECK:  Supple.

HEART:  Showed normal first and second heart sounds.  No gallop, rub or murmur.

CHEST:  Clear to auscultation.  No crepitation or rhonchi.

ABDOMEN:  Distended, soft, nontender.  No guarding or rigidity.  No

organomegaly.  Hernial orifice intact.  Bowel sounds normal.

NEUROLOGIC:  She is awake, alert, responding appropriately.  Cranial nerves

intact.

EXTREMITIES:  She moves extremities without difficulty.  She ambulates with a

walker with minimal assistance.



LABORATORY DATA:  Showed a serum sodium 136, potassium 4.5, chloride 99,

bicarbonate 29, anion gap of 8, BUN 11, creatinine 0.7, estimated GFR was 84 mL

per minute.  Her glucose was 159, calcium was 9.5, magnesium was 1.9.  Total

bilirubin, AST, ALT, alkaline phosphatase were normal.  White cell count was

5300, hemoglobin 13.6, hematocrit 39.5, MCV 85 and platelet count 317,000.  Her

urinalysis showed the urine was cloudy with a pH of 6.5, specific gravity of

1.020.  There was small amount of protein, negative for glucose, trace of

ketones, trace of blood, negative for nitrites and bilirubin.  There was

moderate amount of leukocyte esterase, 1-2 rbc's, more than 40 wbc's, moderate

amount of bacteria.



IMPRESSION AND PLAN:  In summary, this is a 63-year-old female patient, who was

admitted on account of having auditory and visual hallucination, has been

aggressive towards the third person.  She is noncompliant with medication and

care.  She has been having grandiose and Sikh statement that has been

healed.  He has been exit seeking and has failed both inpatient and outpatient

psychiatric intervention and she is here for inpatient psychiatric

stabilization.  Medically, she is known to have type 2 diabetes, glaucoma,

hypertension, peripheral vascular disease, generalized osteoarthritis, chronic

back pain and polyneuropathy.  She did have urinary tract infection, although

the urine culture is still pending at the time of this dictation.  We will

obviously continue with all her current medications.  Continue to monitor her

blood sugar and adjust her insulin as needed.  I will review all her lab work

and make any necessary recommendation.



Thank you, Dr. Rodas for allowing me to participate in the care of this patient.





______________________________

JOSE MTZ MD



DR:  LAY/venancio  JOB#:  [id number] / [id number]

DD:  10/31/2018 15:41  DT:  11/01/2018 05:51

## 2018-11-01 NOTE — PN
DATE:  10/31/2018



PSYCHIATRIC PROGRESS NOTE



This late entry 10/31/2018 covers elements not covered in my initial note.



SUBJECTIVE:  I met with the patient in the evening.  The patient slept 7-1/4

hours previous evening, somewhat withdrawn, anxious.  She does have extensive

past history of alcohol abuse, but none recently.  No clear hallucinations noted

as I addressed with her individually.  She ambulates with a walker.  No CV, ,

pulmonary, eye system symptoms on review.



MENTAL STATUS EXAM:  Reasonably oriented.  Speech is coherent, abstraction fair,

computation impaired, language function intact, attention span short.  Mood and

affect somewhat withdrawn at times.



LABORATORY DATA:  Reviewed.



IMPRESSION:  Bipolar 1 disorder, mixed with psychotic features; history of major

depressive disorder.  Past history of alcohol abuse.



PLAN:  From a psychiatric standpoint, she does have a UTI, which could have

contributed to her admission psychiatric symptoms.  We will maintain Latuda 80

mg at bedtime, Ambien 5 mg at bedtime, start Zoloft 25 mg a day for mood,

anxiety symptoms.  Adjust as clinically indicated.





______________________________

MAN ALEXANDRIA RAZA MD



DR:  AMELIA/venancio  JOB#:  [id number] / [id number]

DD:  11/01/2018 14:13  DT:  11/01/2018 19:59

## 2018-11-02 VITALS — SYSTOLIC BLOOD PRESSURE: 124 MMHG | DIASTOLIC BLOOD PRESSURE: 71 MMHG

## 2018-11-02 VITALS — SYSTOLIC BLOOD PRESSURE: 105 MMHG | DIASTOLIC BLOOD PRESSURE: 65 MMHG

## 2018-11-02 RX ADMIN — Medication SCH CAP: at 07:53

## 2018-11-02 RX ADMIN — SULFAMETHOXAZOLE AND TRIMETHOPRIM SCH TAB: 800; 160 TABLET ORAL at 19:36

## 2018-11-02 RX ADMIN — OXYCODONE HYDROCHLORIDE AND ACETAMINOPHEN SCH MG: 500 TABLET ORAL at 07:53

## 2018-11-02 RX ADMIN — ZOLPIDEM TARTRATE SCH MG: 5 TABLET ORAL at 19:36

## 2018-11-02 RX ADMIN — SERTRALINE SCH MG: 25 TABLET, FILM COATED ORAL at 07:54

## 2018-11-02 RX ADMIN — SULFAMETHOXAZOLE AND TRIMETHOPRIM SCH TAB: 800; 160 TABLET ORAL at 07:54

## 2018-11-02 RX ADMIN — ACETAMINOPHEN PRN MG: 325 TABLET, FILM COATED ORAL at 08:40

## 2018-11-02 RX ADMIN — Medication SCH CAP: at 19:36

## 2018-11-02 RX ADMIN — INSULIN GLARGINE SCH UNITS: 100 INJECTION, SOLUTION SUBCUTANEOUS at 07:56

## 2018-11-02 RX ADMIN — LATANOPROST SCH DROP: 50 SOLUTION OPHTHALMIC at 19:56

## 2018-11-02 RX ADMIN — LURASIDONE HYDROCHLORIDE SCH MG: 40 TABLET, FILM COATED ORAL at 16:50

## 2018-11-02 NOTE — PDOC
Exam


Note:


Talib Note:


Please also refer to the separate dictated note~for this date of service 

dictated separately.~Patient seen individually. Discussed the patient with 

Nursing staff reviewed the chart.~Reviewed interim history and current 

functioning. Reviewed vital signs,~Labs/ Radiology~and current medications 

noted below. Continue current treatment with the changes noted in the dictated 

addendum note





Assessment:


Vital Signs:





 Vital Signs








  Date Time  Temp Pulse Resp B/P (MAP) Pulse Ox O2 Delivery O2 Flow Rate FiO2


 


11/2/18 15:46 98.6 61 18 105/65 (78) 97 Room Air  








I&O











Intake and Output 


 


 11/2/18





 07:00


 


Intake Total 840 ml


 


Balance 840 ml


 


 


 


Intake Oral 840 ml


 


# Voids 1








Labs:





 Laboratory Tests








Test


 11/2/18


07:26


 


Glucose (Fingerstick)


 123 mg/dL


(70-99)  H











Current Medications:


Meds:





Current Medications


Trimethoprim/ Sulfamethoxazole (Bactrim Ds) 1 tab 1X  ONCE PO  Last 

administered on 10/30/18at 09:56;  Start 10/30/18 at 10:10;  Stop 10/30/18 at 10

:11;  Status DC


Acetaminophen (Tylenol) 650 mg PRN Q6HRS  PRN PO PAIN / TEMP Last administered 

on 11/2/18at 08:40;  Start 10/30/18 at 12:00


Multi-Ingredient Ointment (Analgesic Balm) 1 charis PRN QID  PRN TP MUSCLE PAIN;  

Start 10/30/18 at 12:00


Al Hydroxide/Mg Hydroxide (Mylanta Plus Xs) 15 ml PRN AFTMEALHC  PRN PO 

DYSPEPSIA;  Start 10/30/18 at 12:00


Magnesium Hydroxide (Milk Of Magnesia) 2,400 mg PRN QHS  PRN PO CONSTIPATION;  

Start 10/30/18 at 12:00


Diclofenac Sodium (Voltaren) 1 charis PRN QID  PRN TP PAIN;  Start 10/30/18 at 12:

30;  Stop 10/30/18 at 13:22;  Status DC


Ibuprofen (Motrin) 600 mg PRN Q6HRS  PRN PO INFLAMMATION;  Start 10/30/18 at 12:

45


Ascorbic Acid (Vitamin C) 500 mg DAILY PO  Last administered on 11/2/18at 07:53

;  Start 10/31/18 at 09:00


Insulin Glargine (Lantus) 20 units QHS SQ ;  Start 10/30/18 at 21:00;  Stop 10/

30/18 at 21:00;  Status DC


Metformin HCl (Glucophage) 1,000 mg BIDWMEALS PO ;  Start 10/30/18 at 17:00;  

Stop 10/30/18 at 17:00;  Status DC


Metformin HCl (Glucophage) 1,000 mg DAILYWBKFT PO  Last administered on 11/2/ 18at 07:53;  Start 10/31/18 at 08:00


Zolpidem Tartrate (Ambien) 5 mg PRN QHS  PRN PO INSOMNIA;  Start 10/30/18 at 13:

45;  Stop 10/30/18 at 13:46;  Status DC


Benzocaine (Ora-Jel Maximum) 1 charis PRN Q6HRS  PRN TP ORAL PAIN;  Start 10/30/18 

at 13:45


Glucose (Insta-Glucose) 15 gm PRN Q15MIN  PRN PO LOW BLOOD SUGAR;  Start 10/30/

18 at 13:45


Latanoprost (Xalatan) 1 drop QHS OU  Last administered on 11/1/18at 20:28;  

Start 10/30/18 at 21:00;  Stop 11/2/18 at 19:39;  Status DC


Lurasidone HCl (Latuda) 80 mg DAILYWSUP PO  Last administered on 11/2/18at 16:50

;  Start 10/30/18 at 17:00


Insulin Glargine (Lantus) 20 units DAILY SQ  Last administered on 11/2/18at 07:

56;  Start 10/31/18 at 09:00


Zolpidem Tartrate (Ambien) 5 mg QHS PO  Last administered on 11/2/18at 19:36;  

Start 10/30/18 at 21:00


Trimethoprim/ Sulfamethoxazole (Bactrim Ds) 1 tab BID PO  Last administered on 

11/2/18at 19:36;  Start 10/31/18 at 21:00;  Stop 11/7/18 at 20:59


Lactobacillus Rhamnosus (Culturelle) 1 cap BID PO  Last administered on 11/2/ 18at 19:36;  Start 10/31/18 at 21:00


Sertraline HCl (Zoloft) 25 mg DAILY PO  Last administered on 11/2/18at 07:54;  

Start 11/1/18 at 09:00


Latanoprost (Xalatan) 1 drop QHS OU  Last administered on 11/2/18at 19:56;  

Start 11/2/18 at 19:39





Active Scripts


Active


Reported


Glucose (Dextrose) 15 Gm/59 Ml Liquid 15 Gm PO PRN QID


Anbesol (Benzocaine) 9 Gm Gel..gram. 9 Gm MM PRN Q6HRS PRN


Latanoprost 2.5 Ml Drops 1 Drop EACHEYE QHS


Ibuprofen 400 Mg Tablet 600 Mg PO PRN Q6HRS PRN


Metformin Hcl 1,000 Mg Tablet 1,000 Mg PO DAILY


Vitamin C (Ascorbic Acid) 500 Mg Capsule 500 Mg PO DAILY


Levemir (Insulin Detemir) 100 Unit/1 Ml Vial 20 Unit SQ HS


Latuda (Lurasidone Hcl) 80 Mg Tablet 80 Mg PO DAILYBFRSUP


Ambien (Zolpidem Tartrate) 5 Mg Tablet 5 Mg PO PRN QHS PRN


I have reviewed the current psychotropics carefully including drug 

interactions.  Risk benefit ratio favors no change other than as noted in my 

dictated progress note.





Diagnosis:


Problems:  


(1) Urinary tract infection


(2) Diabetes mellitus


(3) Behavioral problem


(4) Medical clearance for psychiatric admission


(5) Anxiety disorder


(6) Bipolar affective, mixed, sev w/ psych


(7) Impulse control disorder











WESLY RAZA MD Nov 2, 2018 23:01

## 2018-11-02 NOTE — PN
DATE:  11/01/2018



PSYCHIATRIC PROGRESS NOTE



This late entry 11/01/2018 covers elements not covered in my initial note.



SUBJECTIVE:  I met with the patient in the evening, staffed at a treatment team

meeting with the entire team in the morning.  Reviewed the patient's history,

diagnosis at length.  Her prior treatment at the inpatient psychiatric Facility

in [place] was reviewed.  The patient slept 6-1/2 hours previous night.  She has

been fairly calm.  Denies any active hallucinations.  No CV, , pulmonary, eye

system symptoms on review.  Ambulation impaired with walker.



MENTAL STATUS EXAM:  Reasonably oriented.  Speech is coherent, has some latency.

 Abstraction fair, computation impaired, language function intact, attention

span short.  Mood and affect showing improvement.



LABORATORY DATA:  Reviewed.



IMPRESSION:  Probable bipolar 1 disorder, mixed history of major depressive

disorder; anxiety disorder, unspecified.



PLAN:  Continue Latuda 80 mg at bedtime; Ambien 5 mg at bedtime; Zoloft 25 mg a

day, may need to increase gradually.





______________________________

MAN ALEXANDRIA RAZA MD



DR:  AMELIA/venancio  JOB#:  [id number] / [id number]

DD:  11/02/2018 12:23  DT:  11/02/2018 20:53

## 2018-11-03 VITALS — SYSTOLIC BLOOD PRESSURE: 106 MMHG | DIASTOLIC BLOOD PRESSURE: 63 MMHG

## 2018-11-03 VITALS — DIASTOLIC BLOOD PRESSURE: 67 MMHG | SYSTOLIC BLOOD PRESSURE: 119 MMHG

## 2018-11-03 RX ADMIN — Medication SCH CAP: at 08:16

## 2018-11-03 RX ADMIN — LATANOPROST SCH DROP: 50 SOLUTION OPHTHALMIC at 20:28

## 2018-11-03 RX ADMIN — ZOLPIDEM TARTRATE SCH MG: 5 TABLET ORAL at 20:28

## 2018-11-03 RX ADMIN — Medication SCH CAP: at 20:27

## 2018-11-03 RX ADMIN — OXYCODONE HYDROCHLORIDE AND ACETAMINOPHEN SCH MG: 500 TABLET ORAL at 08:16

## 2018-11-03 RX ADMIN — SERTRALINE SCH MG: 25 TABLET, FILM COATED ORAL at 08:16

## 2018-11-03 RX ADMIN — SULFAMETHOXAZOLE AND TRIMETHOPRIM SCH TAB: 800; 160 TABLET ORAL at 08:16

## 2018-11-03 RX ADMIN — INSULIN GLARGINE SCH UNITS: 100 INJECTION, SOLUTION SUBCUTANEOUS at 07:52

## 2018-11-03 RX ADMIN — LURASIDONE HYDROCHLORIDE SCH MG: 40 TABLET, FILM COATED ORAL at 16:15

## 2018-11-03 NOTE — PDOC
Exam


Note:


Talib Note:


Please also refer to the separate dictated note~for this date of service 

dictated separately.~Patient seen individually. Discussed the patient with 

Nursing staff reviewed the chart.~Reviewed interim history and current 

functioning. Reviewed vital signs,~Labs/ Radiology~and current medications 

noted below. Continue current treatment with the changes noted in the dictated 

addendum note





Assessment:


Vital Signs:





 Vital Signs








  Date Time  Temp Pulse Resp B/P (MAP) Pulse Ox O2 Delivery O2 Flow Rate FiO2


 


11/3/18 15:48 98.6 62 20 106/63 (77) 97 Room Air  








I&O











Intake and Output 


 


 11/3/18





 07:00


 


Intake Total 960 ml


 


Balance 960 ml


 


 


 


Intake Oral 960 ml


 


# Voids 3








Labs:





 Laboratory Tests








Test


 11/3/18


07:24


 


Glucose (Fingerstick)


 125 mg/dL


(70-99)  H











Current Medications:


Meds:





Current Medications


Trimethoprim/ Sulfamethoxazole (Bactrim Ds) 1 tab 1X  ONCE PO  Last 

administered on 10/30/18at 09:56;  Start 10/30/18 at 10:10;  Stop 10/30/18 at 10

:11;  Status DC


Acetaminophen (Tylenol) 650 mg PRN Q6HRS  PRN PO PAIN / TEMP Last administered 

on 11/2/18at 08:40;  Start 10/30/18 at 12:00


Multi-Ingredient Ointment (Analgesic Balm) 1 charis PRN QID  PRN TP MUSCLE PAIN;  

Start 10/30/18 at 12:00


Al Hydroxide/Mg Hydroxide (Mylanta Plus Xs) 15 ml PRN AFTMEALHC  PRN PO 

DYSPEPSIA;  Start 10/30/18 at 12:00


Magnesium Hydroxide (Milk Of Magnesia) 2,400 mg PRN QHS  PRN PO CONSTIPATION;  

Start 10/30/18 at 12:00


Diclofenac Sodium (Voltaren) 1 charis PRN QID  PRN TP PAIN;  Start 10/30/18 at 12:

30;  Stop 10/30/18 at 13:22;  Status DC


Ibuprofen (Motrin) 600 mg PRN Q6HRS  PRN PO INFLAMMATION;  Start 10/30/18 at 12:

45


Ascorbic Acid (Vitamin C) 500 mg DAILY PO  Last administered on 11/3/18at 08:16

;  Start 10/31/18 at 09:00


Insulin Glargine (Lantus) 20 units QHS SQ ;  Start 10/30/18 at 21:00;  Stop 10/

30/18 at 21:00;  Status DC


Metformin HCl (Glucophage) 1,000 mg BIDWMEALS PO ;  Start 10/30/18 at 17:00;  

Stop 10/30/18 at 17:00;  Status DC


Metformin HCl (Glucophage) 1,000 mg DAILYWBKFT PO  Last administered on 11/3/

18at 08:16;  Start 10/31/18 at 08:00


Zolpidem Tartrate (Ambien) 5 mg PRN QHS  PRN PO INSOMNIA;  Start 10/30/18 at 13:

45;  Stop 10/30/18 at 13:46;  Status DC


Benzocaine (Ora-Jel Maximum) 1 charis PRN Q6HRS  PRN TP ORAL PAIN;  Start 10/30/18 

at 13:45


Glucose (Insta-Glucose) 15 gm PRN Q15MIN  PRN PO LOW BLOOD SUGAR;  Start 10/30/

18 at 13:45


Latanoprost (Xalatan) 1 drop QHS OU  Last administered on 11/1/18at 20:28;  

Start 10/30/18 at 21:00;  Stop 11/2/18 at 19:39;  Status DC


Lurasidone HCl (Latuda) 80 mg DAILYWSUP PO  Last administered on 11/3/18at 16:15

;  Start 10/30/18 at 17:00


Insulin Glargine (Lantus) 20 units DAILY SQ  Last administered on 11/3/18at 07:

52;  Start 10/31/18 at 09:00


Zolpidem Tartrate (Ambien) 5 mg QHS PO  Last administered on 11/3/18at 20:28;  

Start 10/30/18 at 21:00


Trimethoprim/ Sulfamethoxazole (Bactrim Ds) 1 tab BID PO  Last administered on 

11/3/18at 08:16;  Start 10/31/18 at 21:00;  Stop 11/3/18 at 13:30;  Status DC


Lactobacillus Rhamnosus (Culturelle) 1 cap BID PO  Last administered on 11/3/

18at 20:27;  Start 10/31/18 at 21:00


Sertraline HCl (Zoloft) 25 mg DAILY PO  Last administered on 11/3/18at 08:16;  

Start 11/1/18 at 09:00


Latanoprost (Xalatan) 1 drop QHS OU  Last administered on 11/3/18at 20:28;  

Start 11/2/18 at 19:39





Active Scripts


Active


Reported


Glucose (Dextrose) 15 Gm/59 Ml Liquid 15 Gm PO PRN QID


Anbesol (Benzocaine) 9 Gm Gel..gram. 9 Gm MM PRN Q6HRS PRN


Latanoprost 2.5 Ml Drops 1 Drop EACHEYE QHS


Ibuprofen 400 Mg Tablet 600 Mg PO PRN Q6HRS PRN


Metformin Hcl 1,000 Mg Tablet 1,000 Mg PO DAILY


Vitamin C (Ascorbic Acid) 500 Mg Capsule 500 Mg PO DAILY


Levemir (Insulin Detemir) 100 Unit/1 Ml Vial 20 Unit SQ HS


Latuda (Lurasidone Hcl) 80 Mg Tablet 80 Mg PO DAILYBFRSUP


Ambien (Zolpidem Tartrate) 5 Mg Tablet 5 Mg PO PRN QHS PRN


I have reviewed the current psychotropics carefully including drug 

interactions.  Risk benefit ratio favors no change other than as noted in my 

dictated progress note.





Diagnosis:


Problems:  


(1) Urinary tract infection


(2) Diabetes mellitus


(3) Behavioral problem


(4) Medical clearance for psychiatric admission


(5) Anxiety disorder


(6) Bipolar affective, mixed, sev w/ psych


(7) Impulse control disorder











WESLY RAZA MD Nov 3, 2018 23:06

## 2018-11-04 VITALS — DIASTOLIC BLOOD PRESSURE: 66 MMHG | SYSTOLIC BLOOD PRESSURE: 110 MMHG

## 2018-11-04 VITALS — DIASTOLIC BLOOD PRESSURE: 62 MMHG | SYSTOLIC BLOOD PRESSURE: 118 MMHG

## 2018-11-04 RX ADMIN — INSULIN GLARGINE SCH UNITS: 100 INJECTION, SOLUTION SUBCUTANEOUS at 08:19

## 2018-11-04 RX ADMIN — OXYCODONE HYDROCHLORIDE AND ACETAMINOPHEN SCH MG: 500 TABLET ORAL at 08:19

## 2018-11-04 RX ADMIN — ZOLPIDEM TARTRATE SCH MG: 5 TABLET ORAL at 19:29

## 2018-11-04 RX ADMIN — SERTRALINE SCH MG: 25 TABLET, FILM COATED ORAL at 08:20

## 2018-11-04 RX ADMIN — LURASIDONE HYDROCHLORIDE SCH MG: 40 TABLET, FILM COATED ORAL at 16:59

## 2018-11-04 RX ADMIN — Medication SCH CAP: at 08:19

## 2018-11-04 RX ADMIN — LATANOPROST SCH DROP: 50 SOLUTION OPHTHALMIC at 19:30

## 2018-11-04 RX ADMIN — Medication SCH CAP: at 19:29

## 2018-11-04 NOTE — PDOC
Exam


Note:


Talib Note:


Please also refer to the separate dictated note~for this date of service 

dictated separately.~Patient seen individually. Discussed the patient with 

Nursing staff reviewed the chart.~Reviewed interim history and current 

functioning. Reviewed vital signs,~Labs/ Radiology~and current medications 

noted below. Continue current treatment with the changes noted in the dictated 

addendum note





Assessment:


Vital Signs:





 Vital Signs








  Date Time  Temp Pulse Resp B/P (MAP) Pulse Ox O2 Delivery O2 Flow Rate FiO2


 


11/4/18 15:42 97.8 68 18 110/66 (81) 96 Room Air  








I&O











Intake and Output 


 


 11/4/18





 07:00


 


Intake Total 1200 ml


 


Balance 1200 ml


 


 


 


Intake Oral 1200 ml








Labs:





 Laboratory Tests








Test


 11/4/18


07:38


 


Glucose (Fingerstick)


 149 mg/dL


(70-99)  H











Current Medications:


Meds:





Current Medications


Trimethoprim/ Sulfamethoxazole (Bactrim Ds) 1 tab 1X  ONCE PO  Last 

administered on 10/30/18at 09:56;  Start 10/30/18 at 10:10;  Stop 10/30/18 at 10

:11;  Status DC


Acetaminophen (Tylenol) 650 mg PRN Q6HRS  PRN PO PAIN / TEMP Last administered 

on 11/2/18at 08:40;  Start 10/30/18 at 12:00


Multi-Ingredient Ointment (Analgesic Balm) 1 charis PRN QID  PRN TP MUSCLE PAIN;  

Start 10/30/18 at 12:00


Al Hydroxide/Mg Hydroxide (Mylanta Plus Xs) 15 ml PRN AFTMEALHC  PRN PO 

DYSPEPSIA;  Start 10/30/18 at 12:00


Magnesium Hydroxide (Milk Of Magnesia) 2,400 mg PRN QHS  PRN PO CONSTIPATION;  

Start 10/30/18 at 12:00


Diclofenac Sodium (Voltaren) 1 charis PRN QID  PRN TP PAIN;  Start 10/30/18 at 12:

30;  Stop 10/30/18 at 13:22;  Status DC


Ibuprofen (Motrin) 600 mg PRN Q6HRS  PRN PO INFLAMMATION;  Start 10/30/18 at 12:

45


Ascorbic Acid (Vitamin C) 500 mg DAILY PO  Last administered on 11/4/18at 08:19

;  Start 10/31/18 at 09:00


Insulin Glargine (Lantus) 20 units QHS SQ ;  Start 10/30/18 at 21:00;  Stop 10/

30/18 at 21:00;  Status DC


Metformin HCl (Glucophage) 1,000 mg BIDWMEALS PO ;  Start 10/30/18 at 17:00;  

Stop 10/30/18 at 17:00;  Status DC


Metformin HCl (Glucophage) 1,000 mg DAILYWBKFT PO  Last administered on 11/4/ 18at 08:19;  Start 10/31/18 at 08:00


Zolpidem Tartrate (Ambien) 5 mg PRN QHS  PRN PO INSOMNIA;  Start 10/30/18 at 13:

45;  Stop 10/30/18 at 13:46;  Status DC


Benzocaine (Ora-Jel Maximum) 1 charis PRN Q6HRS  PRN TP ORAL PAIN;  Start 10/30/18 

at 13:45


Glucose (Insta-Glucose) 15 gm PRN Q15MIN  PRN PO LOW BLOOD SUGAR;  Start 10/30/

18 at 13:45


Latanoprost (Xalatan) 1 drop QHS OU  Last administered on 11/1/18at 20:28;  

Start 10/30/18 at 21:00;  Stop 11/2/18 at 19:39;  Status DC


Lurasidone HCl (Latuda) 80 mg DAILYWSUP PO  Last administered on 11/4/18at 16:59

;  Start 10/30/18 at 17:00


Insulin Glargine (Lantus) 20 units DAILY SQ  Last administered on 11/4/18at 08:

19;  Start 10/31/18 at 09:00


Zolpidem Tartrate (Ambien) 5 mg QHS PO  Last administered on 11/4/18at 19:29;  

Start 10/30/18 at 21:00


Trimethoprim/ Sulfamethoxazole (Bactrim Ds) 1 tab BID PO  Last administered on 

11/3/18at 08:16;  Start 10/31/18 at 21:00;  Stop 11/3/18 at 13:30;  Status DC


Lactobacillus Rhamnosus (Culturelle) 1 cap BID PO  Last administered on 11/4/ 18at 19:29;  Start 10/31/18 at 21:00


Sertraline HCl (Zoloft) 25 mg DAILY PO  Last administered on 11/4/18at 08:20;  

Start 11/1/18 at 09:00


Latanoprost (Xalatan) 1 drop QHS OU  Last administered on 11/4/18at 19:30;  

Start 11/2/18 at 19:39


Melatonin 3 mg HS PO  Last administered on 11/4/18at 21:28;  Start 11/4/18 at 21

:00





Active Scripts


Active


Reported


Glucose (Dextrose) 15 Gm/59 Ml Liquid 15 Gm PO PRN QID


Anbesol (Benzocaine) 9 Gm Gel..gram. 9 Gm MM PRN Q6HRS PRN


Latanoprost 2.5 Ml Drops 1 Drop EACHEYE QHS


Ibuprofen 400 Mg Tablet 600 Mg PO PRN Q6HRS PRN


Metformin Hcl 1,000 Mg Tablet 1,000 Mg PO DAILY


Vitamin C (Ascorbic Acid) 500 Mg Capsule 500 Mg PO DAILY


Levemir (Insulin Detemir) 100 Unit/1 Ml Vial 20 Unit SQ HS


Latuda (Lurasidone Hcl) 80 Mg Tablet 80 Mg PO DAILYBFRSUP


Ambien (Zolpidem Tartrate) 5 Mg Tablet 5 Mg PO PRN QHS PRN


I have reviewed the current psychotropics carefully including drug 

interactions.  Risk benefit ratio favors no change other than as noted in my 

dictated progress note.





Diagnosis:


Problems:  


(1) Urinary tract infection


(2) Diabetes mellitus


(3) Behavioral problem


(4) Medical clearance for psychiatric admission


(5) Anxiety disorder


(6) Bipolar affective, mixed, sev w/ psych


(7) Impulse control disorder











WESLY RAZA MD Nov 4, 2018 22:57

## 2018-11-04 NOTE — PN
DATE:  11/02/2018



This late entry 11/02/2018 covers elements not covered in my initial note.



SUBJECTIVE:  I met with the patient in the evening.  The patient slept 6-1/4

hours previous night.  She spends much time in her room, isolates, talks to

herself, but on close and thorough questioning stated these were not

hallucinations, withdraw the conversation she has to help her own decision

making process.  Slept 6-1/4 hours.



REVIEW OF SYSTEMS:  No CV, , pulmonary, eye, ENT system symptoms on review. 

Reliability fair.



MENTAL STATUS EXAM:  Reasonably oriented.  Speech is coherent, abstraction fair,

computation somewhat impaired, language function intact, attention span short. 

Mood and affect fairly appropriate.  No suicidal or homicidal ideation.



LABORATORY DATA:  Reviewed.



IMPRESSION:  Probable bipolar 1 disorder, mixed; anxiety disorder, unspecified. 

Rest unchanged.



PLAN:  No change from initial note.  Maintain Latuda, Ambien and Zoloft at

current dosage.  May need to increase Zoloft gradually.





______________________________

WESLY RAZA MD



DR:  AMELIA/venancio  JOB#:  [id number] / [id number]

DD:  11/04/2018 10:47  DT:  11/04/2018 16:06

## 2018-11-05 VITALS — DIASTOLIC BLOOD PRESSURE: 68 MMHG | SYSTOLIC BLOOD PRESSURE: 118 MMHG

## 2018-11-05 RX ADMIN — Medication SCH CAP: at 08:17

## 2018-11-05 RX ADMIN — OXYCODONE HYDROCHLORIDE AND ACETAMINOPHEN SCH MG: 500 TABLET ORAL at 08:17

## 2018-11-05 RX ADMIN — INSULIN GLARGINE SCH UNITS: 100 INJECTION, SOLUTION SUBCUTANEOUS at 08:18

## 2018-11-05 RX ADMIN — SERTRALINE SCH MG: 25 TABLET, FILM COATED ORAL at 08:17

## 2018-11-05 NOTE — PN
DATE:  11/03/2018



PSYCHIATRIC PROGRESS NOTE



This is a late entry of 11/03/2018 covers elements not covered in my initial

note.



SUBJECTIVE:  I met with the patient in the evening.  Overall, the patient

remains withdrawn, spends much time in her room, but denies hallucinating.  She

is noted to be talking to herself at times.  She slept 6-1/4 hours previous

evening.



REVIEW OF SYSTEMS:  No CV, , pulmonary, eye system symptoms on review.



MENTAL STATUS EXAM:  Reasonably oriented.  Speech has some latency, coherent. 

Abstraction fair, computation impaired, language function intact, attention span

short.  Mood and affect somewhat withdrawn.



LABORATORY DATA:  Reviewed.



IMPRESSION:  Bipolar 1 disorder versus bipolar 2 disorder, depressed with

psychotic features; anxiety disorder, unspecified.  Rest unchanged.



PLAN:  No change from initial notes.  Maintain Latuda, Zoloft and Ambien.





______________________________

MAN ALEXANDRIA RAZA MD



DR:  AMELIA/venancio  JOB#:  [id number] / [id number]

DD:  11/04/2018 16:52  DT:  11/04/2018 21:44

## 2018-11-05 NOTE — PN
DATE:  11/04/2018



This is a late entry 11/04/2018, covers the elements not covered in my initial

note.



SUBJECTIVE:  I met with the patient in the evening.  The patient slept 8-3/4

hours the previous evening.  The patient remains isolative, spends much time in

her room.  Talks to herself, but on close questioning, denies she has been

hallucinating.



REVIEW OF SYSTEMS:  No CV, , pulmonary, eye system symptoms on review. 

Ambulates with a walker.



MENTAL STATUS EXAM:  Reasonably oriented.  Speech is coherent, has some latency.

 Abstraction fair, computation impaired, language function intact, attention

span short.  Mood and affect somewhat withdrawn, but showing improvement.



LABORATORY DATA:  Reviewed.



IMPRESSION:  Bipolar 1 disorder, mixed; anxiety disorder, unspecified.



PLAN:  Continue current psychotropics.  She has been started on melatonin 3 mg

by mouth at bedtime.  We will adjust further as clinically indicated.





______________________________

WESLY RAZA MD



DR:  AMELIA/venancio  JOB#:  [id number] / [id number]

DD:  11/05/2018 16:42  DT:  11/05/2018 23:32

## 2018-11-05 NOTE — DS
DATE OF DISCHARGE:  11/05/2018



This note covers the elements not covered in my initial note of 11/05/2018.



REASON FOR ADMISSION:  Please refer to the admission history for details. 

Briefly, the patient is a 63-year-old American-  female referred

from [facility] by her primary care physician on account of auditory

and visual hallucinations, being aggressive with staff members.  Patient is

reportedly talking in third person, noncompliant with medications and cares. 

She had failed outpatient psychiatric interventions for her bipolar disorder. 

More depressed, was referred for inpatient psychiatric stabilization.



SIGNIFICANT FINDINGS AND CLINICAL COURSE:  Following admission, the patient was

seen daily individually by myself, followed medically per Dr. Pulido/Dr. Palmer. 

Patient is somewhat withdrawn, seems to talk to herself, but denied these were

hallucinations, but rather her attempt to clarify her thinking, but talking

aloud.  Adjustments were made in her psychotropics.  Zoloft was added 25 mg a

day.  She seemed to respond to this and a combination of Latuda 80 mg at

bedtime, Ambien 5 mg at bedtime.  Prior to discharge, on 11/05/2018, no CV, ,

pulmonary, eye, ENT system symptoms on review.  Ambulation impaired with walker.



MENTAL STATUS EXAM:  Oriented, reasonably.  Speech, has some latency, coherent. 

Abstraction fair, computation impaired, language function intact, attention span

short.  Mood and affect is improved.



FINAL DIAGNOSES:  Bipolar 1 disorder, mixed with psychotic features, in partial

remission; anxiety disorder, unspecified.  Rest unchanged from admission.



DISCHARGE MEDICATIONS:  Please refer to the MRAD.



DISCHARGE INSTRUCTIONS:  Outpatient psychiatric medical followup at the nursing

home.



Time for discharge day management greater than 30 minutes.





______________________________

MAN ALEXANDRIA RAZA MD



DR:  AMELIA/venancio  JOB#:  [id number] / [id number]

DD:  11/05/2018 16:45  DT:  11/05/2018 17:24

## 2018-11-06 NOTE — PDOC
Exam


Note:


Talib Note:


Late entry for date of service for November 05, 2018.Please also refer to the 

separate dictated note~for this date of service dictated separately.~Patient 

seen individually. Discussed the patient with Nursing staff reviewed the chart.~

Reviewed interim history and current functioning. Reviewed vital signs,~Labs/ 

Radiology~and current medications noted below. Continue current treatment with 

the changes noted in the dictated addendum note





Assessment:


Vital Signs:





 VS - Last 72 Hours, by Label








  Date Time  Temp Pulse Resp B/P (MAP) Pulse Ox O2 Delivery O2 Flow Rate FiO2


 


11/5/18 06:03 97.6 57 18 118/68 (85) 98   


 


11/4/18 15:42 97.8 68 18 110/66 (81) 96 Room Air  


 


11/4/18 06:03 97.2 52 18 118/62 (80) 96   








 Vital Signs








  Date Time  Temp Pulse Resp B/P (MAP) Pulse Ox O2 Delivery O2 Flow Rate FiO2


 


11/5/18 06:03 97.6 57 18 118/68 (85) 98   


 


11/4/18 15:42      Room Air  








I&O











Intake and Output 


 


 11/6/18





 07:00


 


Intake Total 600 ml


 


Balance 600 ml


 


 


 


Intake Oral 600 ml











Current Medications:


Meds:





Current Medications


Trimethoprim/ Sulfamethoxazole (Bactrim Ds) 1 tab 1X  ONCE PO  Last 

administered on 10/30/18at 09:56;  Start 10/30/18 at 10:10;  Stop 10/30/18 at 10

:11;  Status DC


Acetaminophen (Tylenol) 650 mg PRN Q6HRS  PRN PO PAIN / TEMP Last administered 

on 11/2/18at 08:40;  Start 10/30/18 at 12:00;  Stop 11/5/18 at 12:34;  Status DC


Multi-Ingredient Ointment (Analgesic Balm) 1 charis PRN QID  PRN TP MUSCLE PAIN;  

Start 10/30/18 at 12:00;  Stop 11/5/18 at 12:34;  Status DC


Al Hydroxide/Mg Hydroxide (Mylanta Plus Xs) 15 ml PRN AFTMEALHC  PRN PO 

DYSPEPSIA;  Start 10/30/18 at 12:00;  Stop 11/5/18 at 12:34;  Status DC


Magnesium Hydroxide (Milk Of Magnesia) 2,400 mg PRN QHS  PRN PO CONSTIPATION 

Last administered on 11/5/18at 08:17;  Start 10/30/18 at 12:00;  Stop 11/5/18 

at 12:35;  Status DC


Diclofenac Sodium (Voltaren) 1 charis PRN QID  PRN TP PAIN;  Start 10/30/18 at 12:

30;  Stop 10/30/18 at 13:22;  Status DC


Ibuprofen (Motrin) 600 mg PRN Q6HRS  PRN PO INFLAMMATION;  Start 10/30/18 at 12:

45;  Stop 11/5/18 at 12:35;  Status DC


Ascorbic Acid (Vitamin C) 500 mg DAILY PO  Last administered on 11/5/18at 08:17

;  Start 10/31/18 at 09:00;  Stop 11/5/18 at 12:35;  Status DC


Insulin Glargine (Lantus) 20 units QHS SQ ;  Start 10/30/18 at 21:00;  Stop 10/

30/18 at 21:00;  Status DC


Metformin HCl (Glucophage) 1,000 mg BIDWMEALS PO ;  Start 10/30/18 at 17:00;  

Stop 10/30/18 at 17:00;  Status DC


Metformin HCl (Glucophage) 1,000 mg DAILYWBKFT PO  Last administered on 11/5/ 18at 08:17;  Start 10/31/18 at 08:00;  Stop 11/5/18 at 12:35;  Status DC


Zolpidem Tartrate (Ambien) 5 mg PRN QHS  PRN PO INSOMNIA;  Start 10/30/18 at 13:

45;  Stop 10/30/18 at 13:46;  Status DC


Benzocaine (Ora-Jel Maximum) 1 charis PRN Q6HRS  PRN TP ORAL PAIN;  Start 10/30/18 

at 13:45;  Stop 11/5/18 at 12:35;  Status DC


Glucose (Insta-Glucose) 15 gm PRN Q15MIN  PRN PO LOW BLOOD SUGAR;  Start 10/30/

18 at 13:45;  Stop 11/5/18 at 12:35;  Status DC


Latanoprost (Xalatan) 1 drop QHS OU  Last administered on 11/1/18at 20:28;  

Start 10/30/18 at 21:00;  Stop 11/2/18 at 19:39;  Status DC


Lurasidone HCl (Latuda) 80 mg DAILYWSUP PO  Last administered on 11/4/18at 16:59

;  Start 10/30/18 at 17:00;  Stop 11/5/18 at 12:35;  Status DC


Insulin Glargine (Lantus) 20 units DAILY SQ  Last administered on 11/5/18at 08:

18;  Start 10/31/18 at 09:00;  Stop 11/5/18 at 12:35;  Status DC


Zolpidem Tartrate (Ambien) 5 mg QHS PO  Last administered on 11/4/18at 19:29;  

Start 10/30/18 at 21:00;  Stop 11/5/18 at 12:35;  Status DC


Trimethoprim/ Sulfamethoxazole (Bactrim Ds) 1 tab BID PO  Last administered on 

11/3/18at 08:16;  Start 10/31/18 at 21:00;  Stop 11/3/18 at 13:30;  Status DC


Lactobacillus Rhamnosus (Culturelle) 1 cap BID PO  Last administered on 11/5/ 18at 08:17;  Start 10/31/18 at 21:00;  Stop 11/5/18 at 12:35;  Status DC


Sertraline HCl (Zoloft) 25 mg DAILY PO  Last administered on 11/5/18at 08:17;  

Start 11/1/18 at 09:00;  Stop 11/5/18 at 12:35;  Status DC


Latanoprost (Xalatan) 1 drop QHS OU  Last administered on 11/4/18at 19:30;  

Start 11/2/18 at 19:39;  Stop 11/5/18 at 12:35;  Status DC


Melatonin 3 mg HS PO  Last administered on 11/4/18at 21:28;  Start 11/4/18 at 21

:00;  Stop 11/5/18 at 12:35;  Status DC





Active Scripts


Active


Reported


Melatonin 3 Mg Tablet 3 Mg PO HS


Zoloft (Sertraline Hcl) 25 Mg Tablet 25 Mg PO DAILY


Glucose (Dextrose) 15 Gm/59 Ml Liquid 15 Gm PO PRN QID


Anbesol (Benzocaine) 9 Gm Gel..gram. 9 Gm MM PRN Q6HRS PRN


Latanoprost 2.5 Ml Drops 1 Drop EACHEYE QHS


Ibuprofen 400 Mg Tablet 600 Mg PO PRN Q6HRS PRN


Metformin Hcl 1,000 Mg Tablet 1,000 Mg PO DAILY


Vitamin C (Ascorbic Acid) 500 Mg Capsule 500 Mg PO DAILY


Levemir (Insulin Detemir) 100 Unit/1 Ml Vial 20 Unit SQ DAILY


Latuda (Lurasidone Hcl) 80 Mg Tablet 80 Mg PO DAILYBFRSUP


Ambien (Zolpidem Tartrate) 5 Mg Tablet 5 Mg PO HS


I have reviewed the current psychotropics carefully including drug 

interactions.  Risk benefit ratio favors no change other than as noted in my 

dictated progress note.





Diagnosis:


Problems:  


(1) Anxiety disorder


(2) Bipolar affective, mixed, sev w/ psych


(3) Impulse control disorder











WESLY RAZA MD Nov 6, 2018 19:07

## 2019-05-03 ENCOUNTER — HOSPITAL ENCOUNTER (EMERGENCY)
Dept: HOSPITAL 61 - ER | Age: 64
Discharge: HOME | End: 2019-05-03
Payer: SELF-PAY

## 2019-05-03 VITALS — BODY MASS INDEX: 26.66 KG/M2 | HEIGHT: 65 IN | WEIGHT: 160 LBS

## 2019-05-03 VITALS — DIASTOLIC BLOOD PRESSURE: 73 MMHG | SYSTOLIC BLOOD PRESSURE: 164 MMHG

## 2019-05-03 DIAGNOSIS — R41.82: Primary | ICD-10-CM

## 2019-05-03 DIAGNOSIS — Z88.8: ICD-10-CM

## 2019-05-03 LAB
ALBUMIN SERPL-MCNC: 3.8 G/DL (ref 3.4–5)
ALBUMIN/GLOB SERPL: 1.1 {RATIO} (ref 1–1.7)
ALP SERPL-CCNC: 61 U/L (ref 46–116)
ALT SERPL-CCNC: 18 U/L (ref 14–59)
AMORPH SED URNS QL MICRO: PRESENT /HPF
ANION GAP SERPL CALC-SCNC: 11 MMOL/L (ref 6–14)
APTT PPP: YELLOW S
AST SERPL-CCNC: 14 U/L (ref 15–37)
BACTERIA #/AREA URNS HPF: (no result) /HPF
BASOPHILS # BLD AUTO: 0 X10^3/UL (ref 0–0.2)
BASOPHILS NFR BLD: 0 % (ref 0–3)
BILIRUB SERPL-MCNC: 0.4 MG/DL (ref 0.2–1)
BILIRUB UR QL STRIP: NEGATIVE
BUN SERPL-MCNC: 11 MG/DL (ref 7–20)
BUN/CREAT SERPL: 14 (ref 6–20)
CALCIUM SERPL-MCNC: 9.3 MG/DL (ref 8.5–10.1)
CHLORIDE SERPL-SCNC: 98 MMOL/L (ref 98–107)
CO2 SERPL-SCNC: 24 MMOL/L (ref 21–32)
CREAT SERPL-MCNC: 0.8 MG/DL (ref 0.6–1)
EOSINOPHIL NFR BLD: 0 X10^3/UL (ref 0–0.7)
EOSINOPHIL NFR BLD: 1 % (ref 0–3)
ERYTHROCYTE [DISTWIDTH] IN BLOOD BY AUTOMATED COUNT: 13.7 % (ref 11.5–14.5)
FIBRINOGEN PPP-MCNC: CLEAR MG/DL
GFR SERPLBLD BASED ON 1.73 SQ M-ARVRAT: 72.4 ML/MIN
GLOBULIN SER-MCNC: 3.4 G/DL (ref 2.2–3.8)
GLUCOSE SERPL-MCNC: 108 MG/DL (ref 70–99)
HCT VFR BLD CALC: 35.3 % (ref 36–47)
HGB BLD-MCNC: 12.1 G/DL (ref 12–15.5)
HYALINE CASTS #/AREA URNS LPF: (no result) /HPF
LYMPHOCYTES # BLD: 0.7 X10^3/UL (ref 1–4.8)
LYMPHOCYTES NFR BLD AUTO: 12 % (ref 24–48)
MCH RBC QN AUTO: 30 PG (ref 25–35)
MCHC RBC AUTO-ENTMCNC: 34 G/DL (ref 31–37)
MCV RBC AUTO: 87 FL (ref 79–100)
MONO #: 0.5 X10^3/UL (ref 0–1.1)
MONOCYTES NFR BLD: 8 % (ref 0–9)
NEUT #: 4.3 X10^3UL (ref 1.8–7.7)
NEUTROPHILS NFR BLD AUTO: 78 % (ref 31–73)
NITRITE UR QL STRIP: NEGATIVE
PH UR STRIP: 7 [PH]
PLATELET # BLD AUTO: 281 X10^3/UL (ref 140–400)
POTASSIUM SERPL-SCNC: 3.7 MMOL/L (ref 3.5–5.1)
PROT SERPL-MCNC: 7.2 G/DL (ref 6.4–8.2)
PROT UR STRIP-MCNC: NEGATIVE MG/DL
RBC # BLD AUTO: 4.04 X10^6/UL (ref 3.5–5.4)
RBC #/AREA URNS HPF: 0 /HPF (ref 0–2)
SODIUM SERPL-SCNC: 133 MMOL/L (ref 136–145)
SQUAMOUS #/AREA URNS LPF: (no result) /LPF
UROBILINOGEN UR-MCNC: 1 MG/DL
WBC # BLD AUTO: 5.6 X10^3/UL (ref 4–11)
WBC #/AREA URNS HPF: (no result) /HPF (ref 0–4)

## 2019-05-03 PROCEDURE — 81001 URINALYSIS AUTO W/SCOPE: CPT

## 2019-05-03 PROCEDURE — 36415 COLL VENOUS BLD VENIPUNCTURE: CPT

## 2019-05-03 PROCEDURE — 85025 COMPLETE CBC W/AUTO DIFF WBC: CPT

## 2019-05-03 PROCEDURE — 80053 COMPREHEN METABOLIC PANEL: CPT

## 2019-05-03 PROCEDURE — 87086 URINE CULTURE/COLONY COUNT: CPT

## 2019-05-03 PROCEDURE — 99284 EMERGENCY DEPT VISIT MOD MDM: CPT

## 2019-05-03 NOTE — PHYS DOC
Adult General


Chief Complaint


Chief Complaint:  ALTERED MENTAL STATUS





[facility]


HPI





Patient is a 63  year old who is brought to ER EMS with a chief complaint of 

altered mental status. Staff at the facility stated that patient usually sleeps 

at this time but she got up and she was pacing up and down. He also ordered labs

on strum her room. Patient states that she was taking a shower. Staff misted 

this and state that patient was diaphoretic and sweating. Patient states that 

she is feeling well and she does not know why she is in the ER.





Review of Systems


Review of Systems





Constitutional: Denies fever or chills []


Eyes: Denies change in visual acuity, redness, or eye pain []


HENT: Denies nasal congestion or sore throat []


Respiratory: Denies cough or shortness of breath []


Cardiovascular: No additional information not addressed in HPI []


GI: Denies abdominal pain, nausea, vomiting, bloody stools or diarrhea []


: Denies dysuria or hematuria []


Musculoskeletal: Denies back pain or joint pain []


Integument: Denies rash or skin lesions []


Neurologic: Denies headache, focal weakness or sensory changes []


Endocrine: Denies polyuria or polydipsia []





All other systems were reviewed and found to be within normal limits, except as 

documented in this note.





Current Medications


Current Medications





Current Medications








 Medications


  (Trade)  Dose


 Ordered  Sig/Ruth  Start Time


 Stop Time Status Last Admin


Dose Admin


 


 Nitrofurantoin


 Macrocrystals


  (Macrobid)  100 mg  1X  ONCE  5/3/19 06:00


 5/3/19 06:01 DC  





 


 Phenazopyridine


 HCl


  (Pyridium)  200 mg  1X  ONCE  5/3/19 06:00


 5/3/19 06:01 DC  














Allergies


Allergies





Allergies








Coded Allergies Type Severity Reaction Last Updated Verified


 


  bimatoprost Allergy Unknown  5/3/19 Yes


 


  butenafine Allergy Unknown  5/3/19 Yes


 


  glyburide Allergy Unknown  5/3/19 Yes


 


  latanoprost Allergy Unknown  5/3/19 Yes


 


  miconazole Allergy Unknown  5/3/19 Yes


 


  pioglitazone Allergy Unknown  5/3/19 Yes











Physical Exam


Physical Exam





Constitutional: Well developed, well nourished, no acute distress, non-toxic 

appearance.


HENT: Normocephalic, atraumatic, normocaphalic


Eyes: PERRL, EOMI


Neck: Normal range of motion, no tenderness, supple


Cardiovascular:Heart rate regular rhythm, no murmur 


Resp:  Bilateral breath sounds clear to auscultation 


Abdomen: Soft, no tenderness, no distension


Skin: Warm, dry, no erythema, no rash.


Back: No tenderness, no CVA tenderness.


Extremities: No tenderness, ROM intact, no edema.


Neurologic: Alert and oriented X 3, normal motor function, normal sensory 

function, no focal deficits noted.


Psychologic: Affect normal, judgement normal, mood normal.





Current Patient Data


Lab Values





                                Laboratory Tests








Test


 5/3/19


04:35 5/3/19


04:55


 


Urine Collection Type Unknown   


 


Urine Color Yellow   


 


Urine Clarity Clear   


 


Urine pH 7.0   


 


Urine Specific Gravity 1.010   


 


Urine Protein


 Negative mg/dL


(NEG-TRACE) 





 


Urine Glucose (UA)


 Negative mg/dL


(NEG) 





 


Urine Ketones (Stick)


 15 mg/dL (NEG)


 





 


Urine Blood


 Negative (NEG)


 





 


Urine Nitrite


 Negative (NEG)


 





 


Urine Bilirubin


 Negative (NEG)


 





 


Urine Urobilinogen Dipstick


 1.0 mg/dL (0.2


mg/dL) 





 


Urine Leukocyte Esterase Small (NEG)   


 


Urine RBC 0 /HPF (0-2)   


 


Urine WBC


 5-10 /HPF


(0-4) 





 


Urine Squamous Epithelial


Cells Many /LPF  


 





 


Urine Amorphous Sediment Present /HPF   


 


Urine Bacteria


 Few /HPF


(0-FEW) 





 


Urine Hyaline Casts Many /HPF   


 


Urine Mucus Marked /LPF   


 


White Blood Count


 


 5.6 x10^3/uL


(4.0-11.0)


 


Red Blood Count


 


 4.04 x10^6/uL


(3.50-5.40)


 


Hemoglobin


 


 12.1 g/dL


(12.0-15.5)


 


Hematocrit


 


 35.3 %


(36.0-47.0)  L


 


Mean Corpuscular Volume


 


 87 fL ()





 


Mean Corpuscular Hemoglobin  30 pg (25-35)  


 


Mean Corpuscular Hemoglobin


Concent 


 34 g/dL


(31-37)


 


Red Cell Distribution Width


 


 13.7 %


(11.5-14.5)


 


Platelet Count


 


 281 x10^3/uL


(140-400)


 


Neutrophils (%) (Auto)  78 % (31-73)  H


 


Lymphocytes (%) (Auto)  12 % (24-48)  L


 


Monocytes (%) (Auto)  8 % (0-9)  


 


Eosinophils (%) (Auto)  1 % (0-3)  


 


Basophils (%) (Auto)  0 % (0-3)  


 


Neutrophils # (Auto)


 


 4.3 x10^3uL


(1.8-7.7)


 


Lymphocytes # (Auto)


 


 0.7 x10^3/uL


(1.0-4.8)  L


 


Monocytes # (Auto)


 


 0.5 x10^3/uL


(0.0-1.1)


 


Eosinophils # (Auto)


 


 0.0 x10^3/uL


(0.0-0.7)


 


Basophils # (Auto)


 


 0.0 x10^3/uL


(0.0-0.2)


 


Sodium Level


 


 133 mmol/L


(136-145)  L


 


Potassium Level


 


 3.7 mmol/L


(3.5-5.1)


 


Chloride Level


 


 98 mmol/L


()


 


Carbon Dioxide Level


 


 24 mmol/L


(21-32)


 


Anion Gap  11 (6-14)  


 


Blood Urea Nitrogen


 


 11 mg/dL


(7-20)


 


Creatinine


 


 0.8 mg/dL


(0.6-1.0)


 


Estimated GFR


(Cockcroft-Gault) 


 72.4  





 


BUN/Creatinine Ratio  14 (6-20)  


 


Glucose Level


 


 108 mg/dL


(70-99)  H


 


Calcium Level


 


 9.3 mg/dL


(8.5-10.1)


 


Total Bilirubin


 


 0.4 mg/dL


(0.2-1.0)


 


Aspartate Amino Transferase


(AST) 


 14 U/L (15-37)


L


 


Alanine Aminotransferase (ALT)


 


 18 U/L (14-59)





 


Alkaline Phosphatase


 


 61 U/L


()


 


Total Protein


 


 7.2 g/dL


(6.4-8.2)


 


Albumin


 


 3.8 g/dL


(3.4-5.0)


 


Albumin/Globulin Ratio  1.1 (1.0-1.7)  





                                Laboratory Tests


5/3/19 04:55








                                Laboratory Tests


5/3/19 04:55














EKG


EKG


[]





Radiology/Procedures


Radiology/Procedures


[]





Course & Med Decision Making


Course & Med Decision Making


Pertinent Labs reviewed. (See chart for details)





Ordered labs, UA.


Patient is alert and at baseline. She is walking without any difficulty.





Labs are within normal limits.


UA shows that patient has a mild UTI.


We'll treat with oral antibiotics.





Discussed  results and plan of care with patient.


Patient is instructed to follow up with PCP in one to 2 days.


Appropriate discharge instructions given to patient to return to the ED or to 

seek immediate medical evaluation.





Dragon Disclaimer


Dragon Disclaimer


This electronic medical record was generated, in whole or in part, using a voice

 recognition dictation system.





Departure


Departure


Scripts


Nitrofurantoin Monohyd/M-Cryst (MACROBID 100 MG CAPSULE) 100 Mg Capsule


1 CAP PO BID, #10 CAP


   Prov: LORI LEONE DO         5/3/19











LORI LEONE DO            May 3, 2019 05:37

## 2022-01-02 ENCOUNTER — HOSPITAL ENCOUNTER (EMERGENCY)
Dept: HOSPITAL 63 - ER | Age: 67
Discharge: HOME | End: 2022-01-02
Payer: MEDICARE

## 2022-01-02 VITALS — WEIGHT: 113.54 LBS | HEIGHT: 64 IN | BODY MASS INDEX: 19.38 KG/M2

## 2022-01-02 VITALS — DIASTOLIC BLOOD PRESSURE: 59 MMHG | SYSTOLIC BLOOD PRESSURE: 132 MMHG

## 2022-01-02 DIAGNOSIS — E11.9: ICD-10-CM

## 2022-01-02 DIAGNOSIS — F10.20: ICD-10-CM

## 2022-01-02 DIAGNOSIS — I10: ICD-10-CM
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
## 2022-01-02 DIAGNOSIS — M19.90: ICD-10-CM

## 2022-01-02 DIAGNOSIS — Z88.8: ICD-10-CM

## 2022-01-02 DIAGNOSIS — K02.9: Primary | ICD-10-CM

## 2022-01-02 PROCEDURE — 99283 EMERGENCY DEPT VISIT LOW MDM: CPT

## 2022-01-02 NOTE — PHYS DOC
Past History


Past Medical History:  Alcoholism, Arthritis, Diabetes, Hypertension, Other


Past Surgical History:  Other


Alcohol Use:  Occasionally


Drug Use:  None





General Adult


EDM:


Chief Complaint:  OTHER COMPLAINTS





HPI:


HPI:





Patient is a 66-year-old female coming in via EMS from [facility].  

Patient states she wanted medical evaluation at this facility for "curved 

teeth".  Patient states that she had to drink too much of a vanilla protein 

drink and it curved her teeth.  No other complaints.  Patient has a history of 

psychiatric issues and was requested to come here because [facility].  Patient was recently diagnosed with a UTI

and prescribed amoxicillin, per nursing home report she is refusing take her 

medicines today.  They state that she is in her normal state of dementia where 

she has been refusing to open her eyes but then goes back to opening them and 

acting at her baseline.  They state that she is in her normal mental state and 

they only called EMS because she was requesting medical evaluation.  Patient 

says that her only medical complaint is her curved teeth.





Review of Systems:


Review of Systems:


All other systems within normal limits except for as noted in the HPI





Allergies:


Allergies:





Allergies








Coded Allergies Type Severity Reaction Last Updated Verified


 


  bimatoprost Allergy Intermediate  10/30/18 Yes


 


  clotrimazole Allergy Intermediate  10/30/18 Yes


 


  pioglitazone Allergy Intermediate  10/30/18 Yes


 


  glyburide Adverse Reaction Intermediate  10/30/18 Yes











Physical Exam:


PE:


Constitutional: Well developed, well nourished, no acute distress, non-toxic 

appearance. []


HENT: Normocephalic, atraumatic, bilateral external ears normal,  nose normal.  

Multiple dental caries but no gross abnormality, no swelling of gingiva or 

evidence of infection.  []


Eyes: PERRLA, conjunctiva normal, no discharge. [] 


Neck: No rigidity, supple, no stridor. [] 


Cardiovascular: Regular rate and rhythm, brisk cap refill []


Lungs & Thorax: Non labored symmetric respirations, no tachypnea or respiratory 

distress []


Abdomen: Soft, nondistended.


Skin: Warm, dry, no erythema, no rash. [] 


Back: Unremarkable


Extremities: No deformities, range of motion grossly intact, no lower extremity 

edema [] 


Neurologic: Alert and oriented X 3, no focal deficits noted. []


Psychologic: Affect normal, judgement normal, mood normal. []





EKG:


EKG:


[]





Radiology/Procedures:


Radiology/Procedures:


[]





Heart Score:


C/O Chest Pain:  No


Risk Factors:


Risk Factors:  DM, Current or recent (<one month) smoker, HTN, HLP, family 

history of CAD, obesity.


Risk Scores:


Score 0 - 3:  2.5% MACE over next 6 weeks - Discharge Home


Score 4 - 6:  20.3% MACE over next 6 weeks - Admit for Clinical Observation


Score 7 - 10:  72.7% MACE over next 6 weeks - Early Invasive Strategies





Course & Med Decision Making:


Course & Med Decision Making


Recommend patient that she follows up with a dentist for her dental concerns.





Dragon Disclaimer:


Dragon Disclaimer:


This electronic medical record was generated, in whole or in part, using a voice

 recognition dictation system.





Departure


Departure:


Impression:  


   Primary Impression:  


   Dental caries


Disposition:  01 HOME / SELF CARE / HOMELESS


Condition:  STABLE


Referrals:  


JOSE MTZ MD (PCP)


Patient Instructions:  Dental Abscess











RAÚL RANGEL MD             Jan 2, 2022 10:20